# Patient Record
Sex: FEMALE | Race: BLACK OR AFRICAN AMERICAN | Employment: UNEMPLOYED | URBAN - METROPOLITAN AREA
[De-identification: names, ages, dates, MRNs, and addresses within clinical notes are randomized per-mention and may not be internally consistent; named-entity substitution may affect disease eponyms.]

---

## 2017-01-26 PROBLEM — R55 SYNCOPE: Status: ACTIVE | Noted: 2017-01-26

## 2017-01-30 ENCOUNTER — HOSPITAL ENCOUNTER (OUTPATIENT)
Dept: LAB | Age: 42
Discharge: HOME | End: 2017-01-30
Attending: INTERNAL MEDICINE
Payer: COMMERCIAL

## 2017-01-30 DIAGNOSIS — R55 SYNCOPE, UNSPECIFIED SYNCOPE TYPE: ICD-10-CM

## 2017-01-30 LAB — D DIMER PPP FEU-MCNC: 1.14 UG/ML(FEU)

## 2017-01-30 PROCEDURE — 36415 COLL VENOUS BLD VENIPUNCTURE: CPT | Performed by: INTERNAL MEDICINE

## 2017-01-30 PROCEDURE — 85379 FIBRIN DEGRADATION QUANT: CPT | Performed by: INTERNAL MEDICINE

## 2017-02-02 ENCOUNTER — HOSPITAL ENCOUNTER (OUTPATIENT)
Dept: ULTRASOUND IMAGING | Age: 42
Discharge: HOME | DRG: 176 | End: 2017-02-02
Attending: INTERNAL MEDICINE
Payer: COMMERCIAL

## 2017-02-02 ENCOUNTER — HOSPITAL ENCOUNTER (INPATIENT)
Age: 42
LOS: 2 days | Discharge: HOME | DRG: 176 | End: 2017-02-04
Attending: INTERNAL MEDICINE | Admitting: INTERNAL MEDICINE
Payer: COMMERCIAL

## 2017-02-02 ENCOUNTER — HOSPITAL ENCOUNTER (OUTPATIENT)
Dept: CT IMAGING | Age: 42
Discharge: HOME | DRG: 176 | End: 2017-02-02
Attending: INTERNAL MEDICINE
Payer: COMMERCIAL

## 2017-02-02 DIAGNOSIS — D68.51 FACTOR V LEIDEN (HCC): ICD-10-CM

## 2017-02-02 DIAGNOSIS — J90 PLEURAL EFFUSION: ICD-10-CM

## 2017-02-02 DIAGNOSIS — R55 SYNCOPE, UNSPECIFIED SYNCOPE TYPE: ICD-10-CM

## 2017-02-02 DIAGNOSIS — I26.99 OTHER ACUTE PULMONARY EMBOLISM WITHOUT ACUTE COR PULMONALE (HCC): ICD-10-CM

## 2017-02-02 LAB
ANION GAP BLD CALC-SCNC: 8 MMOL/L (ref 7–16)
BUN SERPL-MCNC: 6 MG/DL (ref 6–23)
CALCIUM SERPL-MCNC: 8.3 MG/DL (ref 8.3–10.4)
CHLORIDE SERPL-SCNC: 110 MMOL/L (ref 98–107)
CO2 SERPL-SCNC: 27 MMOL/L (ref 21–32)
CREAT SERPL-MCNC: 0.56 MG/DL (ref 0.6–1)
GLUCOSE SERPL-MCNC: 92 MG/DL (ref 65–100)
POTASSIUM SERPL-SCNC: 3.4 MMOL/L (ref 3.5–5.1)
SODIUM SERPL-SCNC: 145 MMOL/L (ref 136–145)

## 2017-02-02 PROCEDURE — 80048 BASIC METABOLIC PNL TOTAL CA: CPT | Performed by: NURSE PRACTITIONER

## 2017-02-02 PROCEDURE — 74011250637 HC RX REV CODE- 250/637: Performed by: INTERNAL MEDICINE

## 2017-02-02 PROCEDURE — 65660000000 HC RM CCU STEPDOWN

## 2017-02-02 PROCEDURE — 74011250637 HC RX REV CODE- 250/637: Performed by: NURSE PRACTITIONER

## 2017-02-02 PROCEDURE — 99254 IP/OBS CNSLTJ NEW/EST MOD 60: CPT | Performed by: INTERNAL MEDICINE

## 2017-02-02 PROCEDURE — 36415 COLL VENOUS BLD VENIPUNCTURE: CPT | Performed by: NURSE PRACTITIONER

## 2017-02-02 RX ORDER — NITROGLYCERIN 0.4 MG/1
0.4 TABLET SUBLINGUAL
Status: DISCONTINUED | OUTPATIENT
Start: 2017-02-02 | End: 2017-02-04 | Stop reason: HOSPADM

## 2017-02-02 RX ORDER — HYDROCODONE BITARTRATE AND ACETAMINOPHEN 5; 325 MG/1; MG/1
1 TABLET ORAL
Status: DISCONTINUED | OUTPATIENT
Start: 2017-02-02 | End: 2017-02-04 | Stop reason: HOSPADM

## 2017-02-02 RX ORDER — ESCITALOPRAM OXALATE 10 MG/1
10 TABLET ORAL DAILY
COMMUNITY
End: 2019-09-20

## 2017-02-02 RX ORDER — ESCITALOPRAM OXALATE 10 MG/1
15 TABLET ORAL EVERY EVENING
Status: DISCONTINUED | OUTPATIENT
Start: 2017-02-02 | End: 2017-02-04 | Stop reason: HOSPADM

## 2017-02-02 RX ORDER — SODIUM CHLORIDE 0.9 % (FLUSH) 0.9 %
10 SYRINGE (ML) INJECTION
Status: COMPLETED | OUTPATIENT
Start: 2017-02-02 | End: 2017-02-02

## 2017-02-02 RX ORDER — TEMAZEPAM 30 MG/1
30 CAPSULE ORAL
COMMUNITY
End: 2018-01-30

## 2017-02-02 RX ORDER — ESCITALOPRAM OXALATE 5 MG/1
5 TABLET ORAL DAILY
COMMUNITY
End: 2017-08-25

## 2017-02-02 RX ORDER — SERTRALINE HYDROCHLORIDE 100 MG/1
200 TABLET, FILM COATED ORAL DAILY
Status: DISCONTINUED | OUTPATIENT
Start: 2017-02-03 | End: 2017-02-02

## 2017-02-02 RX ORDER — LAMOTRIGINE 100 MG/1
100 TABLET ORAL DAILY
Status: DISCONTINUED | OUTPATIENT
Start: 2017-02-03 | End: 2017-02-04 | Stop reason: HOSPADM

## 2017-02-02 RX ORDER — MORPHINE SULFATE 2 MG/ML
2 INJECTION, SOLUTION INTRAMUSCULAR; INTRAVENOUS
Status: DISCONTINUED | OUTPATIENT
Start: 2017-02-02 | End: 2017-02-04 | Stop reason: HOSPADM

## 2017-02-02 RX ORDER — OLANZAPINE 5 MG/1
15 TABLET ORAL
Status: DISCONTINUED | OUTPATIENT
Start: 2017-02-02 | End: 2017-02-04 | Stop reason: HOSPADM

## 2017-02-02 RX ORDER — GUAIFENESIN 100 MG/5ML
81 LIQUID (ML) ORAL DAILY
Status: DISCONTINUED | OUTPATIENT
Start: 2017-02-03 | End: 2017-02-03

## 2017-02-02 RX ORDER — TEMAZEPAM 15 MG/1
30 CAPSULE ORAL
Status: DISCONTINUED | OUTPATIENT
Start: 2017-02-02 | End: 2017-02-04 | Stop reason: HOSPADM

## 2017-02-02 RX ORDER — SODIUM CHLORIDE 0.9 % (FLUSH) 0.9 %
5-10 SYRINGE (ML) INJECTION AS NEEDED
Status: DISCONTINUED | OUTPATIENT
Start: 2017-02-02 | End: 2017-02-04 | Stop reason: HOSPADM

## 2017-02-02 RX ORDER — ACETAMINOPHEN 325 MG/1
650 TABLET ORAL
Status: DISCONTINUED | OUTPATIENT
Start: 2017-02-02 | End: 2017-02-04 | Stop reason: HOSPADM

## 2017-02-02 RX ORDER — SODIUM CHLORIDE 0.9 % (FLUSH) 0.9 %
5-10 SYRINGE (ML) INJECTION EVERY 8 HOURS
Status: DISCONTINUED | OUTPATIENT
Start: 2017-02-02 | End: 2017-02-04 | Stop reason: HOSPADM

## 2017-02-02 RX ADMIN — Medication 10 ML: at 21:42

## 2017-02-02 RX ADMIN — Medication 10 ML: at 14:45

## 2017-02-02 RX ADMIN — SODIUM CHLORIDE 100 ML: 900 INJECTION, SOLUTION INTRAVENOUS at 14:45

## 2017-02-02 RX ADMIN — OLANZAPINE 15 MG: 5 TABLET, FILM COATED ORAL at 20:15

## 2017-02-02 RX ADMIN — IOVERSOL 100 ML: 741 INJECTION INTRA-ARTERIAL; INTRAVENOUS at 14:45

## 2017-02-02 RX ADMIN — TEMAZEPAM 30 MG: 15 CAPSULE ORAL at 21:40

## 2017-02-02 NOTE — PROGRESS NOTES
Spoke with Dr Lexsi Vo regarding patient PTA meds. Patient states MD recently stopped Zoloft and started patient on Lexapro 15mg daily. Patient also states takes temazepam 30mg every night for sleep.   Orders given, read back, and placed per patients prior to admission medication list.

## 2017-02-02 NOTE — PROGRESS NOTES
Patient arrived to floor via EMS. Patient alert and orientated with no distress noted. Respirations even and unlabored with no s/s of sob noted. Duel skin assessment completed with VICKIE Rubio. Patient has no skin issues noted. Patient able to ambulate independently without assistance but on total bedrest at this time r/t PE. Patient orientated to room and surroundings. Bed in low locked position with call light within reach. IV in right hand with no s/s of redness or infiltration noted.     Report to be given to oncoming RN 7p-7a

## 2017-02-02 NOTE — PROGRESS NOTES
Admission database complete. New patient packet given and reviewed with patient/family. Patient/family oriented to room and call system. SBAR handoff given to primary nurse.

## 2017-02-03 PROBLEM — R55 SYNCOPE: Status: ACTIVE | Noted: 2017-02-02

## 2017-02-03 LAB
ANION GAP BLD CALC-SCNC: 9 MMOL/L (ref 7–16)
BUN SERPL-MCNC: 5 MG/DL (ref 6–23)
CALCIUM SERPL-MCNC: 8.5 MG/DL (ref 8.3–10.4)
CHLORIDE SERPL-SCNC: 108 MMOL/L (ref 98–107)
CO2 SERPL-SCNC: 28 MMOL/L (ref 21–32)
CREAT SERPL-MCNC: 0.62 MG/DL (ref 0.6–1)
ERYTHROCYTE [DISTWIDTH] IN BLOOD BY AUTOMATED COUNT: 12.9 % (ref 11.9–14.6)
GLUCOSE SERPL-MCNC: 83 MG/DL (ref 65–100)
HCT VFR BLD AUTO: 37.3 % (ref 35.8–46.3)
HGB BLD-MCNC: 11.9 G/DL (ref 11.7–15.4)
MCH RBC QN AUTO: 27.5 PG (ref 26.1–32.9)
MCHC RBC AUTO-ENTMCNC: 31.9 G/DL (ref 31.4–35)
MCV RBC AUTO: 86.3 FL (ref 79.6–97.8)
PLATELET # BLD AUTO: 266 K/UL (ref 150–450)
PMV BLD AUTO: 10.2 FL (ref 10.8–14.1)
POTASSIUM SERPL-SCNC: 3.8 MMOL/L (ref 3.5–5.1)
RBC # BLD AUTO: 4.32 M/UL (ref 4.05–5.25)
SODIUM SERPL-SCNC: 145 MMOL/L (ref 136–145)
WBC # BLD AUTO: 6.5 K/UL (ref 4.3–11.1)

## 2017-02-03 PROCEDURE — 85027 COMPLETE CBC AUTOMATED: CPT | Performed by: NURSE PRACTITIONER

## 2017-02-03 PROCEDURE — 80048 BASIC METABOLIC PNL TOTAL CA: CPT | Performed by: NURSE PRACTITIONER

## 2017-02-03 PROCEDURE — 65660000000 HC RM CCU STEPDOWN

## 2017-02-03 PROCEDURE — 74011250637 HC RX REV CODE- 250/637: Performed by: INTERNAL MEDICINE

## 2017-02-03 PROCEDURE — 99232 SBSQ HOSP IP/OBS MODERATE 35: CPT | Performed by: INTERNAL MEDICINE

## 2017-02-03 PROCEDURE — 74011250637 HC RX REV CODE- 250/637: Performed by: NURSE PRACTITIONER

## 2017-02-03 PROCEDURE — 36415 COLL VENOUS BLD VENIPUNCTURE: CPT | Performed by: NURSE PRACTITIONER

## 2017-02-03 PROCEDURE — 93306 TTE W/DOPPLER COMPLETE: CPT

## 2017-02-03 RX ADMIN — ASPIRIN 81 MG 81 MG: 81 TABLET ORAL at 07:54

## 2017-02-03 RX ADMIN — RIVAROXABAN 15 MG: 15 TABLET, FILM COATED ORAL at 16:57

## 2017-02-03 RX ADMIN — LAMOTRIGINE 100 MG: 100 TABLET ORAL at 07:54

## 2017-02-03 RX ADMIN — Medication 10 ML: at 05:55

## 2017-02-03 RX ADMIN — ESCITALOPRAM OXALATE 15 MG: 10 TABLET ORAL at 16:57

## 2017-02-03 RX ADMIN — OLANZAPINE 15 MG: 5 TABLET, FILM COATED ORAL at 21:23

## 2017-02-03 RX ADMIN — RIVAROXABAN 15 MG: 15 TABLET, FILM COATED ORAL at 07:53

## 2017-02-03 RX ADMIN — Medication 10 ML: at 22:00

## 2017-02-03 RX ADMIN — TEMAZEPAM 30 MG: 15 CAPSULE ORAL at 21:31

## 2017-02-03 RX ADMIN — Medication 5 ML: at 16:58

## 2017-02-03 NOTE — PROGRESS NOTES
[facility] CARDIOLOGY PROGRESS NOTE      2/3/2017 10:51 AM    Admit Date: 2/2/2017    Admit Diagnosis: PULMONARY EMBOLI;Pulmonary embolism (HCC)      Subjective:   No chest pain or dyspnea.   bedrest       Objective:      Vitals:    02/02/17 2355 02/03/17 0358 02/03/17 0415 02/03/17 0734   BP: 90/65 90/59  98/67   Pulse: 78 (!) 125 80 72   Resp: 18 19  19   Temp: 97.6 °F (36.4 °C) 97.6 °F (36.4 °C)  97.4 °F (36.3 °C)   SpO2: 93% 91%  96%       Physical Exam:  Neck-   CV- rr+r  Lungs- clear  Ext-  Skin- warm and dry        Data Review:   Recent Labs      02/03/17   0640  02/02/17   2045   NA  145  145   K  3.8  3.4*   BUN  5*  6   CREA  0.62  0.56*   GLU  83  92   WBC  6.5   --    HGB  11.9   --    HCT  37.3   --    PLT  266   --        Assessment and Plan:     Principal Problem:    Pulmonary embolism ([facility] [address] (2/2/2017)        Factor V Leiden    Fibroid  operation in December     Xarelto begun  does not need aspirin which would increase bleeding risk   echo pending     home  tomorrow if stable       Active Problems:    Syncope (2/2/2017)      Factor V Leiden ([facility] [address] (2/2/2017)        Melida Tran MD

## 2017-02-03 NOTE — CONSULTS
CONSULT NOTE    Carlos Zuniga    2/2/2017    Date of Admission:  2/2/2017    The patient's chart is reviewed and the patient is discussed with the staff. Subjective:     Patient is a 46 y.o. female seen and evaluated at the request of Dr. Amanda Camacho. The patient has a history of factor V leiden mutation and had a PE in 2010 around the time of a c section. She lived in [place] at that time, was treated with coumadin for 1 year and followed by hematology there. She was then taken off the coumadin in 2011 and has been on no blood thinners since. About a month ago she suddenly lost consciousness while talking to a family member. With her first PE she had dyspnea, this time she denies any dyspnea, chest pain, leg swelling, or calf pain. She does not smoke and is on no hormone therapy. She denies any periods of long immobility. She was seen by cardiology for syncope and part of her workup was an outpatient CT done today which showed multiple R sided PE's. She was admitted for tele monitoring and initiation of anticoagulation. Last time when she was on coumadin she had no issues with bleeding. Review of Systems  A comprehensive review of systems was negative except for that written in the HPI. Patient Active Problem List   Diagnosis Code    Syncope R55    Pulmonary embolism ([facility] [address] I26.99    Factor V Leiden ([facility] D68.51       Prior to Admission Medications   Prescriptions Last Dose Informant Patient Reported? Taking? OLANZapine (ZYPREXA) 15 mg tablet   Yes No   Sig: Take 15 mg by mouth nightly. escitalopram oxalate (LEXAPRO) 10 mg tablet   Yes Yes   Sig: Take 10 mg by mouth daily. escitalopram oxalate (LEXAPRO) 5 mg tablet   Yes Yes   Sig: Take 5 mg by mouth daily. Patient takes a 10mg and 5mg tablet to total 15mg once daily. lamoTRIgine (LAMICTAL) 100 mg tablet   Yes No   Sig: Take  by mouth daily.    temazepam (RESTORIL) 30 mg capsule   Yes Yes   Sig: Take 30 mg by mouth nightly. Facility-Administered Medications: None       Past Medical History   Diagnosis Date    Bipolar disorder, unspecified ([facility] [address]     Pulmonary embolism ([facility] [address]      Past Surgical History   Procedure Laterality Date    Hx gastric bypass      Hx  section      Hx myomectomy       Social History     Social History    Marital status:      Spouse name: N/A    Number of children: N/A    Years of education: N/A     Occupational History    Not on file.      Social History Main Topics    Smoking status: Never Smoker    Smokeless tobacco: Never Used    Alcohol use No    Drug use: No    Sexual activity: Not on file     Other Topics Concern    Not on file     Social History Narrative     Family History   Problem Relation Age of Onset    Stroke Father     Hypertension Father     Diabetes Father      Allergies   Allergen Reactions    Sulfa (Sulfonamide Antibiotics) Unknown (comments)       Current Facility-Administered Medications   Medication Dose Route Frequency    OLANZapine (ZyPREXA) tablet 15 mg  15 mg Oral QHS    [START ON 2/3/2017] lamoTRIgine (LaMICtal) tablet 100 mg  100 mg Oral DAILY    sodium chloride (NS) flush 5-10 mL  5-10 mL IntraVENous Q8H    sodium chloride (NS) flush 5-10 mL  5-10 mL IntraVENous PRN    [START ON 2/3/2017] aspirin chewable tablet 81 mg  81 mg Oral DAILY    nitroglycerin (NITROSTAT) tablet 0.4 mg  0.4 mg SubLINGual Q5MIN PRN    acetaminophen (TYLENOL) tablet 650 mg  650 mg Oral Q4H PRN    HYDROcodone-acetaminophen (NORCO) 5-325 mg per tablet 1 Tab  1 Tab Oral Q4H PRN    morphine injection 2 mg  2 mg IntraVENous Q4H PRN    rivaroxaban (XARELTO) tablet 15 mg  15 mg Oral BID WITH MEALS    escitalopram oxalate (LEXAPRO) tablet 15 mg  15 mg Oral QPM    temazepam (RESTORIL) capsule 30 mg  30 mg Oral QHS PRN         Objective:     Vitals:    17 1811   BP: 117/75   Pulse: 70   Resp: 20   Temp: 97.9 °F (36.6 °C)   SpO2: 98%       PHYSICAL EXAM Constitutional:  the patient is well developed and in no acute distress  EENMT:  Sclera clear, pupils equal, oral mucosa moist  Respiratory: CTA B, no w/r/r  Cardiovascular:  RRR without M,G,R  Gastrointestinal: soft and non-tender; with positive bowel sounds. Musculoskeletal: warm without cyanosis. There is no lower leg edema. Skin:  no jaundice or rashes, no wounds   Neurologic: no gross neuro deficits     Psychiatric:  alert and oriented x ppt    Chest CT:    2/2/17  1. Multiple right-sided pulmonary emboli. 2. Mild cardiomegaly and small bilateral dependent pleural effusions. An  additional component of mild fluid overload cannot be excluded which may be  secondary to the pulmonary emboli. No results for input(s): WBC, HGB, HCT, PLT, INR, HGBEXT, HCTEXT, PLTEXT, HGBEXT, HCTEXT, PLTEXT in the last 72 hours. No lab exists for component: INREXT, INREXT  No results for input(s): NA, K, CL, GLU, CO2, BUN, CREA, MG, PHOS, CA, TROIQ, ALB, TBIL, TBILI, GPT, ALT, SGOT, BNPP in the last 72 hours. No lab exists for component: TROIP  No results for input(s): PH, PCO2, PO2, HCO3 in the last 72 hours. No results for input(s): LCAD, LAC in the last 72 hours. Assessment:  (Medical Decision Making)     Hospital Problems  Date Reviewed: 1/30/2017          Codes Class Noted POA    * (Principal)Pulmonary embolism ([facility] [address] ICD-10-CM: I26.99  ICD-9-CM: 415.19  2/2/2017 Unknown        Factor V Leiden [facility]) ICD-10-CM: T59.93  ICD-9-CM: 289.81  2/2/2017 Unknown        Syncope ICD-10-CM: R55  ICD-9-CM: 780.2  1/26/2017 Yes            Patient with factor V leiden mutation, now presenting with her second episode of PE. While this certainly may be the cause of her syncope, her presenting symptom last time was simply dyspnea. Plan:  (Medical Decision Making)     --Agree with tele monitoring to rule out other abnormalities. -Agree with choice of xarelto for anticoagulation.  Discussed with her that she will need life long anticoagulation since this is her second episode of clotting.   -she was requesting to be connected with hematology. I do not think inpatient heme consult is necessary but would support an outpatient referral to them for ongoing management. -currently on room air.   --TTE ordered by primary team. Will follow up results tomorrow. More than 50% of the time documented was spent in face-to-face contact with the patient and in the care of the patient on the floor/unit where the patient is located. Thank you very much for this referral.  We appreciate the opportunity to participate in this patient's care. Will follow along with above stated plan.     Sean Tracy MD

## 2017-02-03 NOTE — PROGRESS NOTES
Francoise Stein  Admission Date: 2017             Daily Progress Note: 2/3/2017    The patient's chart is reviewed and the patient is discussed with the staff.    46 y.o. Admitted by cardiology and she suddenly lost consciousness while talking to a family member about 1 month ago. She was seen by cardiology and part of her workup was an outpatient CT done today which showed multiple R sided PE's. She was admitted for tele monitoring and initiation of anticoagulation. She has history of factor V Leiden mutation, had a PE in  around the time of a , was treated with coumadin for 1 year and followed by hematology in [place]. Was taken off Coumadin in  and had had no bleeding issues. With her first PE she had dyspnea, but denies any dyspnea, chest pain, leg swelling, or calf pain. She does not smoke and is on no hormone therapy. She denies any periods of long immobility. Subjective:     Lying in bed and denies pain or shortness of breath.   On bedrest.      Current Facility-Administered Medications   Medication Dose Route Frequency    OLANZapine (ZyPREXA) tablet 15 mg  15 mg Oral QHS    lamoTRIgine (LaMICtal) tablet 100 mg  100 mg Oral DAILY    sodium chloride (NS) flush 5-10 mL  5-10 mL IntraVENous Q8H    sodium chloride (NS) flush 5-10 mL  5-10 mL IntraVENous PRN    aspirin chewable tablet 81 mg  81 mg Oral DAILY    nitroglycerin (NITROSTAT) tablet 0.4 mg  0.4 mg SubLINGual Q5MIN PRN    acetaminophen (TYLENOL) tablet 650 mg  650 mg Oral Q4H PRN    HYDROcodone-acetaminophen (NORCO) 5-325 mg per tablet 1 Tab  1 Tab Oral Q4H PRN    morphine injection 2 mg  2 mg IntraVENous Q4H PRN    rivaroxaban (XARELTO) tablet 15 mg  15 mg Oral BID WITH MEALS    escitalopram oxalate (LEXAPRO) tablet 15 mg  15 mg Oral QPM    temazepam (RESTORIL) capsule 30 mg  30 mg Oral QHS PRN       Review of Systems  Constitutional: negative for fever, chills, sweats  Cardiovascular: negative for chest pain, palpitations, syncope, edema  Gastrointestinal:  negative for dysphagia, reflux, vomiting, diarrhea, abdominal pain, or melena  Neurologic:  negative for focal weakness, numbness, headache    Objective:     Vitals:    02/02/17 2355 02/03/17 0358 02/03/17 0415 02/03/17 0734   BP: 90/65 90/59  98/67   Pulse: 78 (!) 125 80 72   Resp: 18 19  19   Temp: 97.6 °F (36.4 °C) 97.6 °F (36.4 °C)  97.4 °F (36.3 °C)   SpO2: 93% 91%  96%     Intake and Output:   02/01 1901 - 02/03 0700  In: 80 [P.O.:580]  Out: -        Physical Exam:   Constitution:  the patient is well developed and in no acute distress, room air  EENMT:  Sclera clear, pupils equal, oral mucosa moist  Respiratory: clear anterior and posterior, no wheezing  Cardiovascular:  RRR without M,G,R  Gastrointestinal: soft and non-tender; with positive bowel sounds. Musculoskeletal: warm without cyanosis. There is no lower leg edema. Skin:  no jaundice or rashes, no wounds   Neurologic: no gross neuro deficits     Psychiatric:  alert and oriented x 3    CHEST CT 2/2/17:  1. Multiple right-sided pulmonary emboli. 2. Mild cardiomegaly and small bilateral dependent pleural effusions. An additional component of mild fluid overload cannot be excluded which may be secondary to the pulmonary emboli. CHEST XRAY: none today      LAB  No results for input(s): GLUCPOC in the last 72 hours. No lab exists for component: [place]   Recent Labs      02/03/17   0640   WBC  6.5   HGB  11.9   HCT  37.3   PLT  266     Recent Labs      02/02/17   2045   NA  145   K  3.4*   CL  110*   CO2  27   GLU  92   BUN  6   CREA  0.56*   CA  8.3     No results for input(s): PH, PCO2, PO2, HCO3 in the last 72 hours. No results for input(s): LCAD, LAC in the last 72 hours.       Assessment:  (Medical Decision Making)     Hospital Problems  Date Reviewed: 2/3/2017          Codes Class Noted POA    Syncope ICD-10-CM: R55  ICD-9-CM: 780.2  2/2/2017 Yes    1 month ago    * (Principal)Pulmonary embolism ([facility] [address] ICD-10-CM: I26.99  ICD-9-CM: 415.19  2/2/2017 Yes    Recurrent--fist in 2010    Factor V Leiden [facility]) ICD-10-CM: P23.89  ICD-9-CM: 289.81  2/2/2017 Yes    chronic          Plan:  (Medical Decision Making)     --Xarelto 15mg BID--needs life long anticoagulation  --ECHO pending  --LE US negative for DVT  --DDimer 1.14  --CBC, BMP pending  --OOB with assistance--LE negative  --Will need hematology follow up as out patient. More than 50% of the time documented was spent in face-to-face contact with the patient and in the care of the patient on the floor/unit where the patient is located. David Becerra NP     Lungs:  clear  Heart:  RRR with no Murmur/Rubs/Gallops; no definite increased P2. Additional Comments: Reports that she may not have been able to move legs after waking up from syncopal episode. Needs bubble echo. I have spoken with and examined the patient. I agree with the above assessment and plan as documented.     Presley Gupta MD

## 2017-02-03 NOTE — PROGRESS NOTES
Pt is lying quietly in bed. Pt is on full bedrest. Respirations are even and unlabored. No signs or symptoms of distress are noted. No SOB or pain is reported at this time. Call light is within reach. Will continue to monitor.

## 2017-02-03 NOTE — PROGRESS NOTES
Patient voices no concerns at this time. Call light within reach. Patient able to ambulate with assistance r/t syncope episodes. Will continue to monitor.

## 2017-02-03 NOTE — PROGRESS NOTES
Patient in bed resting with no complaints at this time. Patient is alert and orientated with no distress noted. IV intact and patent with no s/s of infection noted. Respirations even and unlabored with heart rate regular. Patient able to ambulate independently without assistance but on bedrest at this time r/t PE. Bed in low locked position with call light within reach. Patient instructed to call if assistance is needed. Will continue to monitor.

## 2017-02-03 NOTE — H&P
[address]   HISTORY AND PHYSICAL       Name:  Porfirio Juan   MR#:  [id number]   :  1975   Account #:  [de-identified]   Date of Adm:  2017       REASON FOR ADMISSION: Acute pulmonary emboli. HISTORY OF PRESENT ILLNESS: Ms. Brett Hewitt is a pleasant 24-year-old   female who was seen in our office on 2017 after a syncopal   episode. She was talking with her sister, when she suddenly   became unresponsive. She denies any chest pain or dyspnea prior   to or after the event. She has a history of prior pulmonary   embolism after a  and is homozygous for factor V Leiden   gene mutation. She was taken off anticoagulation by hematologist   in the past. She was seen by Wilmer Mccord. Demetrio Argueta MD, on   2017, and a CT scan of her chest was ordered. This shows   multiple right-sided pulmonary emboli. There was mild   cardiomegaly and small bilateral dependent pleural effusions. She also underwent lower extremity duplex, which showed no   evidence of deep venous thrombosis. Based on her abnormal CT   scan, she was admitted for anticoagulation initiation. She is   currently chest pain free. She has no dyspnea. She is not   hypoxic. PAST MEDICAL HISTORY   1. History of homozygous factor V Leiden deficiency. 2. History of pulmonary embolism in  after a . 3. Prior history of moderate pulmonary hypertension with RVSP 49   on a prior echocardiogram.   4. Syncope. 5. Bipolar disorder. 6. History of prior  and gastric bypass. MEDICATIONS   Include   1. Lexapro 15 mg a day. 2. Lamictal 100 mg a day. 3. Zyprexa 15 mg at bedtime. 4. Restoril 30 mg at bedtime. ALLERGIES: SULFA. SOCIAL HISTORY: She denies any tobacco or alcohol use. FAMILY HISTORY: Father with stroke, diabetes and hypertension. REVIEW OF SYSTEMS   CONSTITUTIONAL: No fever or chills. EYES: No acute visual disturbances. ENT: No changes in hearing or epistaxis. CARDIOVASCULAR: Syncope. No chest pain. PULMONARY: No active dyspnea. GASTROINTESTINAL: No blood in her stool or melena. GENITOURINARY: Recently completed her menstrual cycle. MUSCULOSKELETAL: No muscular weakness. NEUROLOGIC: Recent syncope. No focal motor or sensory defects. SKIN: No active rashes or lesions. PSYCHIATRIC: Well-controlled bipolar disorder. No active   depression or hailey. ENDOCRINE: No heat or cold intolerance. PHYSICAL EXAMINATION   VITAL SIGNS: Temperature is 97.9, pulse 70, blood pressure   117/75, pulse oximetry 98% on room air. GENERAL: Pleasant female in no acute distress. HEENT: Pupils equal, round, react to light and accommodation. Extraocular movements intact. No scleral icterus or injection. NECK: Supple, 2+ carotid pulses. No carotid bruits. CARDIOVASCULAR: Regular rate and rhythm. Normal S1, S2. No   murmurs, rubs or gallops. LUNGS: Clear to auscultation and percussion bilaterally. ABDOMEN: Soft, nontender, nondistended with normoactive bowel   sounds. EXTREMITIES: No significant edema, 2+ distal pulses. NEUROLOGIC: Alert and oriented x3. Cranial nerves 2 through 12   are grossly intact. Motor and sensory exam nonfocal.   PSYCHIATRIC: Normal mood and affect. SKIN: Warm and dry. LYMPHATIC: No cervical lymphadenopathy. LABORATORY DATA: Her D-dimer on 01/30/2017 was 1.14. CT scan and lower extremity duplex as above. ASSESSMENT AND PLAN:   1. Acute pulmonary embolism: Start Xarelto 15 mg p.o. b.i.d. Check echocardiogram. Place on telemetry. 2. Syncope: Likely secondary to pulmonary emboli. We will place   on telemetry for cardiac arrhythmias. Check echocardiogram   tomorrow morning. 3. Mild to moderate pulmonary hypertension: Check echo and   evaluate RV systolic pressures. 4. Further recommendations as course progresses.         MD MATTY Reyes / Dorothy Noble   D:  02/02/2017   18:43   T:  02/02/2017   19:07   Job #: [id number]

## 2017-02-04 VITALS
DIASTOLIC BLOOD PRESSURE: 74 MMHG | SYSTOLIC BLOOD PRESSURE: 100 MMHG | TEMPERATURE: 97.9 F | HEART RATE: 68 BPM | RESPIRATION RATE: 16 BRPM | OXYGEN SATURATION: 94 %

## 2017-02-04 PROBLEM — J90 PLEURAL EFFUSION: Status: ACTIVE | Noted: 2017-02-04

## 2017-02-04 LAB
ANION GAP BLD CALC-SCNC: 7 MMOL/L (ref 7–16)
BUN SERPL-MCNC: 8 MG/DL (ref 6–23)
CALCIUM SERPL-MCNC: 8.4 MG/DL (ref 8.3–10.4)
CHLORIDE SERPL-SCNC: 108 MMOL/L (ref 98–107)
CO2 SERPL-SCNC: 27 MMOL/L (ref 21–32)
CREAT SERPL-MCNC: 0.71 MG/DL (ref 0.6–1)
ERYTHROCYTE [DISTWIDTH] IN BLOOD BY AUTOMATED COUNT: 12.8 % (ref 11.9–14.6)
GLUCOSE SERPL-MCNC: 117 MG/DL (ref 65–100)
HCT VFR BLD AUTO: 38.6 % (ref 35.8–46.3)
HGB BLD-MCNC: 12.4 G/DL (ref 11.7–15.4)
MCH RBC QN AUTO: 27.9 PG (ref 26.1–32.9)
MCHC RBC AUTO-ENTMCNC: 32.1 G/DL (ref 31.4–35)
MCV RBC AUTO: 86.7 FL (ref 79.6–97.8)
PLATELET # BLD AUTO: 271 K/UL (ref 150–450)
PMV BLD AUTO: 10.2 FL (ref 10.8–14.1)
POTASSIUM SERPL-SCNC: 3.8 MMOL/L (ref 3.5–5.1)
RBC # BLD AUTO: 4.45 M/UL (ref 4.05–5.25)
SODIUM SERPL-SCNC: 142 MMOL/L (ref 136–145)
WBC # BLD AUTO: 6.9 K/UL (ref 4.3–11.1)

## 2017-02-04 PROCEDURE — 36415 COLL VENOUS BLD VENIPUNCTURE: CPT | Performed by: NURSE PRACTITIONER

## 2017-02-04 PROCEDURE — 99232 SBSQ HOSP IP/OBS MODERATE 35: CPT | Performed by: INTERNAL MEDICINE

## 2017-02-04 PROCEDURE — 85027 COMPLETE CBC AUTOMATED: CPT | Performed by: NURSE PRACTITIONER

## 2017-02-04 PROCEDURE — 74011250637 HC RX REV CODE- 250/637: Performed by: NURSE PRACTITIONER

## 2017-02-04 PROCEDURE — 80048 BASIC METABOLIC PNL TOTAL CA: CPT | Performed by: NURSE PRACTITIONER

## 2017-02-04 RX ADMIN — RIVAROXABAN 15 MG: 15 TABLET, FILM COATED ORAL at 07:59

## 2017-02-04 RX ADMIN — Medication 10 ML: at 05:28

## 2017-02-04 RX ADMIN — LAMOTRIGINE 100 MG: 100 TABLET ORAL at 08:00

## 2017-02-04 NOTE — PROGRESS NOTES
[facility]  Admission Date: 2017             Daily Progress Note: 2017    The patient's chart is reviewed and the patient is discussed with the staff.    46 y.o. Admitted by cardiology and she suddenly lost consciousness while talking to a family member about 1 month ago. She was seen by cardiology and part of her workup was an outpatient CT done today which showed multiple R sided PE's. She was admitted for tele monitoring and initiation of anticoagulation. She has history of factor V Leiden mutation, had a PE in  around the time of a , was treated with coumadin for 1 year and followed by hematology in [place]. Was taken off Coumadin in  and had had no bleeding issues. With her first PE she had dyspnea, but denies any dyspnea, chest pain, leg swelling, or calf pain. She does not smoke and is on no hormone therapy. She denies any periods of long immobility. Subjective:     Comfortable on RA. No problems getting to BR. No complaints of pain, hemoptysis, or dyspnea.     Current Facility-Administered Medications   Medication Dose Route Frequency    OLANZapine (ZyPREXA) tablet 15 mg  15 mg Oral QHS    lamoTRIgine (LaMICtal) tablet 100 mg  100 mg Oral DAILY    sodium chloride (NS) flush 5-10 mL  5-10 mL IntraVENous Q8H    sodium chloride (NS) flush 5-10 mL  5-10 mL IntraVENous PRN    nitroglycerin (NITROSTAT) tablet 0.4 mg  0.4 mg SubLINGual Q5MIN PRN    acetaminophen (TYLENOL) tablet 650 mg  650 mg Oral Q4H PRN    HYDROcodone-acetaminophen (NORCO) 5-325 mg per tablet 1 Tab  1 Tab Oral Q4H PRN    morphine injection 2 mg  2 mg IntraVENous Q4H PRN    rivaroxaban (XARELTO) tablet 15 mg  15 mg Oral BID WITH MEALS    escitalopram oxalate (LEXAPRO) tablet 15 mg  15 mg Oral QPM    temazepam (RESTORIL) capsule 30 mg  30 mg Oral QHS PRN       Review of Systems  Constitutional: negative for fever, chills, sweats  Cardiovascular: negative for chest pain, palpitations, syncope, edema  Gastrointestinal:  negative for dysphagia, reflux, vomiting, diarrhea, abdominal pain, or melena  Neurologic:  negative for focal weakness, numbness, headache    Objective:     Vitals:    02/03/17 1453 02/03/17 1922 02/03/17 2237 02/04/17 0346   BP: 98/55 98/51 90/60 91/55   Pulse: 72 88 75 69   Resp: 18 20 20 18   Temp: 98 °F (36.7 °C) 98 °F (36.7 °C) 97.8 °F (36.6 °C) 97.9 °F (36.6 °C)   SpO2: 97% 93% 96% 97%     Intake and Output:   02/02 1901 - 02/04 0700  In: 1660 [P.O.:1660]  Out: 300 [Urine:300]       Physical Exam:   Constitution:  the patient is well developed and in no acute distress, room air  EENMT:  Sclera clear, pupils equal, oral mucosa moist  Respiratory: clear anterior and posterior, no wheezing  Cardiovascular:  RRR without M,G,R  Gastrointestinal: soft and non-tender; with positive bowel sounds. Musculoskeletal: warm without cyanosis. There is no lower leg edema. Skin:  no jaundice or rashes, no wounds   Neurologic: no gross neuro deficits     Psychiatric:  alert and oriented x 3    CHEST CT 2/2/17:  1. Multiple right-sided pulmonary emboli. 2. Mild cardiomegaly and small bilateral dependent pleural effusions. An additional component of mild fluid overload cannot be excluded which may be secondary to the pulmonary emboli. CHEST XRAY: none today      LAB  No results for input(s): GLUCPOC in the last 72 hours. No lab exists for component: [place]   Recent Labs      02/03/17   0640   WBC  6.5   HGB  11.9   HCT  37.3   PLT  266     Recent Labs      02/03/17   0640  02/02/17   2045   NA  145  145   K  3.8  3.4*   CL  108*  110*   CO2  28  27   GLU  83  92   BUN  5*  6   CREA  0.62  0.56*   CA  8.5  8.3     No results for input(s): PH, PCO2, PO2, HCO3 in the last 72 hours. No results for input(s): LCAD, LAC in the last 72 hours. Echo:    SUMMARY:    -  Left ventricle: Systolic function was normal. Ejection fraction was  estimated in the range of 55 % to 60 %. There were no regional wall motion  abnormalities. Doppler parameters were consistent with mild diastolic  dysfunction (grade 1). -  Atrial septum: Agitated saline contrast injection (bubble study) was  performed. There was no right-to-left shunt, at rest or induced by the   Valsalva  maneuver. -  Inferior vena cava, hepatic veins: The inferior vena cava was dilated. Respirophasic changes in dimension were absent. -  Pericardium: There was a moderate-sized left pleural effusion. Assessment:  (Medical Decision Making)     Hospital Problems  Date Reviewed: 2/4/2017          Codes Class Noted POA    Pleural effusion ICD-10-CM: J90  ICD-9-CM: 511.9  2/4/2017 Unknown    Small by CT    Syncope ICD-10-CM: R55  ICD-9-CM: 780.2  2/2/2017 Yes    From PTE. * (Principal)Pulmonary embolism ([address] ICD-10-CM: I26.99  ICD-9-CM: 415.19  2/2/2017 Yes    On Xarelto    Factor V Leiden [facility]) ICD-10-CM: K36.90  ICD-9-CM: 289.81  2/2/2017 Yes    Will need lifelong anticoagulation at this point. Plan:  (Medical Decision Making)     --Xarelto 15mg BID--needs life long anticoagulation. Convert to 20 mg daily after 3 weeks. --Effusion should resolve with time. Can follow up with us if persists. --Will need hematology follow up as out patient. --Lilianae Bella for discharge from our standpoint. More than 50% of the time documented was spent in face-to-face contact with the patient and in the care of the patient on the floor/unit where the patient is located.     Ashre Lopez MD

## 2017-02-04 NOTE — DISCHARGE INSTRUCTIONS
DISCHARGE SUMMARY from Nurse    The following personal items are in your possession at time of discharge:       Visual Aid: None     Home Medications: None                      PATIENT INSTRUCTIONS:    After general anesthesia or intravenous sedation, for 24 hours or while taking prescription Narcotics:  · Limit your activities  · Do not drive and operate hazardous machinery  · Do not make important personal or business decisions  · Do  not drink alcoholic beverages  · If you have not urinated within 8 hours after discharge, please contact your surgeon on call. Report the following to your surgeon:  · Excessive pain, swelling, redness or odor of or around the surgical area  · Temperature over 100.5  · Nausea and vomiting lasting longer than 4 hours or if unable to take medications  · Any signs of decreased circulation or nerve impairment to extremity: change in color, persistent  numbness, tingling, coldness or increase pain  · Any questions        What to do at Home:  Recommended activity: Activity as tolerated. If you experience any of the following symptoms shortness of breath or fatigue unrelieved by rest, please follow up with PCP. *  Please give a list of your current medications to your Primary Care Provider. *  Please update this list whenever your medications are discontinued, doses are      changed, or new medications (including over-the-counter products) are added. *  Please carry medication information at all times in case of emergency situations. These are general instructions for a healthy lifestyle:    No smoking/ No tobacco products/ Avoid exposure to second hand smoke    Surgeon General's Warning:  Quitting smoking now greatly reduces serious risk to your health.     Obesity, smoking, and sedentary lifestyle greatly increases your risk for illness    A healthy diet, regular physical exercise & weight monitoring are important for maintaining a healthy lifestyle    You may be retaining fluid if you have a history of heart failure or if you experience any of the following symptoms:  Weight gain of 3 pounds or more overnight or 5 pounds in a week, increased swelling in our hands or feet or shortness of breath while lying flat in bed. Please call your doctor as soon as you notice any of these symptoms; do not wait until your next office visit. Recognize signs and symptoms of STROKE:    F-face looks uneven    A-arms unable to move or move unevenly    S-speech slurred or non-existent    T-time-call 911 as soon as signs and symptoms begin-DO NOT go       Back to bed or wait to see if you get better-TIME IS BRAIN. Warning Signs of HEART ATTACK     Call 911 if you have these symptoms:   Chest discomfort. Most heart attacks involve discomfort in the center of the chest that lasts more than a few minutes, or that goes away and comes back. It can feel like uncomfortable pressure, squeezing, fullness, or pain.  Discomfort in other areas of the upper body. Symptoms can include pain or discomfort in one or both arms, the back, neck, jaw, or stomach.  Shortness of breath with or without chest discomfort.  Other signs may include breaking out in a cold sweat, nausea, or lightheadedness. Don't wait more than five minutes to call 911 - MINUTES MATTER! Fast action can save your life. Calling 911 is almost always the fastest way to get lifesaving treatment. Emergency Medical Services staff can begin treatment when they arrive -- up to an hour sooner than if someone gets to the hospital by car. The discharge information has been reviewed with the patient. The patient verbalized understanding. Discharge medications reviewed with the patient and appropriate educational materials and side effects teaching were provided. Pulmonary Embolism: Care Instructions  Your Care Instructions  Pulmonary embolism is the sudden blockage of an artery in the lung.  Blood clots in the deep veins of the leg or pelvis (deep vein thrombosis, or DVT) are the most common cause. These blood clots can travel to the lungs. Pulmonary embolism can be very serious. Because you have had one pulmonary embolism, you are at greater risk for having another one. But you can take steps to prevent another pulmonary embolism by following your doctor's instructions. You will probably take a prescription blood-thinning medicine to prevent blood clots. A blood thinner can stop a blood clot from growing larger and prevent new clots from forming. Follow-up care is a key part of your treatment and safety. Be sure to make and go to all appointments, and call your doctor if you are having problems. It's also a good idea to know your test results and keep a list of the medicines you take. How can you care for yourself at home? · Take your medicines exactly as prescribed. Call your doctor if you think you are having a problem with your medicine. You will get more details on the specific medicines your doctor prescribes. · If you are taking a blood thinner, be sure you get instructions about how to take your medicine safely. Blood thinners can cause serious bleeding problems. Preventing future pulmonary embolisms  · Exercise. Keep blood moving in your legs to keep clots from forming. If you are traveling by car, stop every hour or so. Get out and walk around for a few minutes. If you are traveling by bus, train, or plane, get out of your seat and walk up and down the aisles every hour or so. You also can do leg exercises while you are seated. Pump your feet up and down by pulling your toes up toward your knees then pointing them down. · Get up out of bed as soon as possible after an illness or surgery. · Do not smoke. If you need help quitting, talk to your doctor about stop-smoking programs and medicines. These can increase your chances of quitting for good.   · Check with your doctor before taking hormone or birth control pills. These may increase your risk of blot clots. · Ask your doctor about wearing compression stockings to help prevent blood clots in your legs. You can buy these with a prescription at medical supply stores and some drugstores. When should you call for help? Call 911 anytime you think you may need emergency care. For example, call if:  · You have shortness of breath. · You have chest pain. · You passed out (lost consciousness). · You cough up blood. Call your doctor now or seek immediate medical care if:  · You have new or worsening pain or swelling in your leg. Watch closely for changes in your health, and be sure to contact your doctor if:  · You do not get better as expected. Where can you learn more? Go to http://denise-zarina.info/. Enter D997 in the search box to learn more about \"Pulmonary Embolism: Care Instructions. \"  Current as of: June 4, 2016  Content Version: 11.1  © 6654-7522 [employer]. Care instructions adapted under license by [employer] (which disclaims liability or warranty for this information). If you have questions about a medical condition or this instruction, always ask your healthcare professional. [address] any warranty or liability for your use of this information.

## 2017-02-04 NOTE — PROGRESS NOTES
Patient in bed resting with no complaints at this time. Patient alert and orientated with no distress noted. IV intact and patent with s/s of infection noted. Respirations even and unlabored with heart rate regular. Patient able to ambulate independently without assistance but right now needs x1 r/t syncope episode. Bed in low locked position with call light within reach. Patient instructed to call if assistance is needed. Will continue to monitor.

## 2017-02-04 NOTE — DISCHARGE SUMMARY
[facility] Cardiology Discharge Summary     Patient ID:  Luke Rojas  [id number]  89 y.o.  1975    Admit date: 2/2/2017    Discharge date:  2/4/17    Admitting Physician: Alvin Lee MD     Discharge Physician: Raghavendra Gimenez NP/Dr. Alex Mccallum    Admission Diagnoses: PULMONARY EMBOLI  Pulmonary embolism [facility])    Discharge Diagnoses:   Patient Active Problem List    Diagnosis Date Noted    Pleural effusion 02/04/2017    Syncope 02/02/2017    Pulmonary embolism ([facility] [address] 02/02/2017    Factor V Leiden ([facility] [address] 02/02/2017       Cardiology Procedures this admission:  EchoCardiogram  CT chest  Consults: Pulmonary/Intensive care    Hospital Course: [place] [facility] [place] for [place]. The patient had prior hx PE so Dr. Nicol Middleton ordered Ct scan of chest. The CT scan showed multiple right sided PEs. The patient was admitted and started on xarelto 15mg bid x 21 days then she will take 20 mg daily. An echocardiogram showed -  Left ventricle: Systolic function was normal. Ejection fraction was  estimated in the range of 55 % to 60 %. There were no regional wall motion abnormalities. Doppler parameters were consistent with mild diastolic dysfunction (grade 1). -  Atrial septum: Agitated saline contrast injection (bubble study) was performed. There was no right-to-left shunt, at rest or induced by the Valsalva maneuver. -  Inferior vena cava, hepatic veins: The inferior vena cava was dilated. Respirophasic changes in dimension were absent. -  Pericardium: There was a moderate-sized left pleural effusion. [facility] was consulted and felt her pleural effusion secondary to PEs. The patient also had dopplers of lower ext that were negative for DVT. The morning of 2/4/17, the patient was feeling much better. The patient was seen and examined by Dr. Alex Mccallum and was determined stable and ready for discharge home.   The patient was instructed on the importance of medication compliance and outpatient follow up. The patient will follow up with [address] [place] [address] [address]. Puja Mullins in 2-3 weeks. The patient will need out patient heme consult. DISPOSITION: The patient is being discharged home in stable condition on a low saturated fat, low cholesterol and low salt diet. The patient is instructed to advance activities as tolerated to the limit of fatigue or shortness of breath. The patient is instructed to call the office or return to the ER for immediate evaluation for any severe shortness of breath, chest pain, prolonged palpitations, near syncope or syncope. Discharge Exam:   Visit Vitals    /74    Pulse 68    Temp 97.9 °F (36.6 °C)    Resp 16    SpO2 94%     Patient has been seen by Dr. Kaylin Vargas: see his progress note for exam details. Recent Results (from the past 24 hour(s))   METABOLIC PANEL, BASIC    Collection Time: 02/04/17  8:28 AM   Result Value Ref Range    Sodium 142 136 - 145 mmol/L    Potassium 3.8 3.5 - 5.1 mmol/L    Chloride 108 (H) 98 - 107 mmol/L    CO2 27 21 - 32 mmol/L    Anion gap 7 7 - 16 mmol/L    Glucose 117 (H) 65 - 100 mg/dL    BUN 8 6 - 23 MG/DL    Creatinine 0.71 0.6 - 1.0 MG/DL    GFR est AA >60 >60 ml/min/1.73m2    GFR est non-AA >60 >60 ml/min/1.73m2    Calcium 8.4 8.3 - 10.4 MG/DL   CBC W/O DIFF    Collection Time: 02/04/17  8:28 AM   Result Value Ref Range    WBC 6.9 4.3 - 11.1 K/uL    RBC 4.45 4.05 - 5.25 M/uL    HGB 12.4 11.7 - 15.4 g/dL    HCT 38.6 35.8 - 46.3 %    MCV 86.7 79.6 - 97.8 FL    MCH 27.9 26.1 - 32.9 PG    MCHC 32.1 31.4 - 35.0 g/dL    RDW 12.8 11.9 - 14.6 %    PLATELET 342 314 - 007 K/uL    MPV 10.2 (L) 10.8 - 14.1 FL         Patient Instructions:   Current Discharge Medication List      START taking these medications    Details   !! rivaroxaban (XARELTO) 15 mg tab tablet Take 1 Tab by mouth two (2) times daily (with meals) for 19 days.  Then start 20mg daily  Qty: 38 Tab, Refills: 0      !! rivaroxaban (XARELTO) 20 mg tab tablet Take 1 Tab by mouth daily (with breakfast). Qty: 30 Tab, Refills: 11        !! - Potential duplicate medications found. Please discuss with provider. CONTINUE these medications which have NOT CHANGED    Details   !! escitalopram oxalate (LEXAPRO) 10 mg tablet Take 10 mg by mouth daily. !! escitalopram oxalate (LEXAPRO) 5 mg tablet Take 5 mg by mouth daily. Patient takes a 10mg and 5mg tablet to total 15mg once daily. temazepam (RESTORIL) 30 mg capsule Take 30 mg by mouth nightly. OLANZapine (ZYPREXA) 15 mg tablet Take 15 mg by mouth nightly. lamoTRIgine (LAMICTAL) 100 mg tablet Take  by mouth daily. !! - Potential duplicate medications found. Please discuss with provider.             Signed:  Vicki Duff NP  2/4/2017  2:13 PM

## 2017-02-04 NOTE — PROGRESS NOTES
Pt is resting in bed. Respirations are even and unlabored. Family is at the bedside. Respirations are even and unlabored. No signs or symptoms of distress are noted. No SOB or pain is reported at this time. Call light is within reach. Will continue to monitor.

## 2017-02-04 NOTE — PROGRESS NOTES
2/4/2017 10:45 AM    Admit Date: 2/2/2017        Subjective:     Eleonora Ruiz denies chest pain, dyspnea, palpitations. Objective:      Visit Vitals    /74    Pulse 68    Temp 97.9 °F (36.6 °C)    Resp 16    SpO2 94%       Physical Exam:  Lungs: clear to auscultation bilaterally  Abdomen: soft, non-tender.  Bowel sounds normal. No masses,  no organomegaly  Extremities: extremities normal, atraumatic, no cyanosis or edema    Data Review:   Labs:    Recent Results (from the past 24 hour(s))   METABOLIC PANEL, BASIC    Collection Time: 02/04/17  8:28 AM   Result Value Ref Range    Sodium 142 136 - 145 mmol/L    Potassium 3.8 3.5 - 5.1 mmol/L    Chloride 108 (H) 98 - 107 mmol/L    CO2 27 21 - 32 mmol/L    Anion gap 7 7 - 16 mmol/L    Glucose 117 (H) 65 - 100 mg/dL    BUN 8 6 - 23 MG/DL    Creatinine 0.71 0.6 - 1.0 MG/DL    GFR est AA >60 >60 ml/min/1.73m2    GFR est non-AA >60 >60 ml/min/1.73m2    Calcium 8.4 8.3 - 10.4 MG/DL   CBC W/O DIFF    Collection Time: 02/04/17  8:28 AM   Result Value Ref Range    WBC 6.9 4.3 - 11.1 K/uL    RBC 4.45 4.05 - 5.25 M/uL    HGB 12.4 11.7 - 15.4 g/dL    HCT 38.6 35.8 - 46.3 %    MCV 86.7 79.6 - 97.8 FL    MCH 27.9 26.1 - 32.9 PG    MCHC 32.1 31.4 - 35.0 g/dL    RDW 12.8 11.9 - 14.6 %    PLATELET 447 261 - 229 K/uL    MPV 10.2 (L) 10.8 - 14.1 FL       Telemetry: normal sinus rhythm    ECHO nl LV and RV function      Assessment:     Patient Active Problem List    Diagnosis Date Noted    Pleural effusion related to PE per Dr Allan Bhatti ECHO nl 02/04/2017    Syncope 1 month ago NSR 02/02/2017    Pulmonary embolism ([facility] [address] recurrent on xarelto 02/02/2017    Factor V Leiden ([address] 02/02/2017       Plan:   Pul states pt can go home on xarelto

## 2017-02-04 NOTE — PROGRESS NOTES
Patient given discharge instructions with time given for questions and answers. Patient given a copy of d/c instructions. Patient given prescriptions. Patient to be released to .

## 2017-08-25 ENCOUNTER — HOSPITAL ENCOUNTER (OUTPATIENT)
Dept: LAB | Age: 42
Discharge: HOME | End: 2017-08-25
Payer: COMMERCIAL

## 2017-08-25 DIAGNOSIS — D68.51 FACTOR V LEIDEN (HCC): ICD-10-CM

## 2017-08-25 DIAGNOSIS — I26.99 OTHER ACUTE PULMONARY EMBOLISM WITHOUT ACUTE COR PULMONALE (HCC): ICD-10-CM

## 2017-08-25 PROBLEM — D50.0 IRON DEFICIENCY ANEMIA DUE TO CHRONIC BLOOD LOSS: Status: ACTIVE | Noted: 2017-08-25

## 2017-08-25 LAB
ALBUMIN SERPL-MCNC: 3.3 G/DL (ref 3.5–5)
ALBUMIN/GLOB SERPL: 1 {RATIO}
ALP SERPL-CCNC: 68 U/L (ref 50–136)
ALT SERPL-CCNC: 11 U/L (ref 12–65)
ANION GAP SERPL CALC-SCNC: 6 MMOL/L
AST SERPL-CCNC: 8 U/L (ref 15–37)
BASOPHILS # BLD: 0 K/UL (ref 0–0.2)
BASOPHILS NFR BLD: 0 % (ref 0–2)
BILIRUB SERPL-MCNC: 0.3 MG/DL (ref 0.2–1.1)
BUN SERPL-MCNC: 8 MG/DL (ref 6–23)
CALCIUM SERPL-MCNC: 8.3 MG/DL (ref 8.3–10.4)
CHLORIDE SERPL-SCNC: 108 MMOL/L (ref 98–107)
CO2 SERPL-SCNC: 28 MMOL/L (ref 21–32)
CREAT SERPL-MCNC: 0.6 MG/DL (ref 0.6–1)
DIFFERENTIAL METHOD BLD: ABNORMAL
EOSINOPHIL # BLD: 0.1 K/UL (ref 0–0.8)
EOSINOPHIL NFR BLD: 2 % (ref 0.5–7.8)
ERYTHROCYTE [DISTWIDTH] IN BLOOD BY AUTOMATED COUNT: 22.2 % (ref 11.9–14.6)
FERRITIN SERPL-MCNC: 26 NG/ML (ref 8–388)
GLOBULIN SER CALC-MCNC: 3.3 G/DL
GLUCOSE SERPL-MCNC: 77 MG/DL (ref 65–100)
HCT VFR BLD AUTO: 37 % (ref 35.8–46.3)
HGB BLD-MCNC: 11.1 G/DL (ref 11.7–15.4)
IRON SATN MFR SERPL: 14 %
IRON SERPL-MCNC: 60 UG/DL (ref 35–150)
LYMPHOCYTES # BLD: 2.2 K/UL (ref 0.5–4.6)
LYMPHOCYTES NFR BLD: 31 % (ref 13–44)
MCH RBC QN AUTO: 24.3 PG (ref 26.1–32.9)
MCHC RBC AUTO-ENTMCNC: 30 G/DL (ref 31.4–35)
MCV RBC AUTO: 81.1 FL (ref 79.6–97.8)
MONOCYTES # BLD: 0.5 K/UL (ref 0.1–1.3)
MONOCYTES NFR BLD: 7 % (ref 4–12)
NEUTS SEG # BLD: 4.3 K/UL (ref 1.7–8.2)
NEUTS SEG NFR BLD: 60 % (ref 43–78)
NRBC # BLD: 0.01 K/UL (ref 0–0.2)
NRBC BLD-RTO: 0.1 PER 100 WBC (ref 0–2)
PLATELET # BLD AUTO: 269 K/UL (ref 150–450)
PMV BLD AUTO: 10.3 FL (ref 10.8–14.1)
POTASSIUM SERPL-SCNC: 4.2 MMOL/L (ref 3.5–5.1)
PROT SERPL-MCNC: 6.6 G/DL (ref 6.3–8.2)
RBC # BLD AUTO: 4.56 M/UL (ref 4.05–5.25)
SODIUM SERPL-SCNC: 142 MMOL/L (ref 136–145)
TIBC SERPL-MCNC: 440 UG/DL (ref 250–450)
WBC # BLD AUTO: 7.1 K/UL (ref 4.3–11.1)

## 2017-08-25 PROCEDURE — 83540 ASSAY OF IRON: CPT | Performed by: INTERNAL MEDICINE

## 2017-08-25 PROCEDURE — 36415 COLL VENOUS BLD VENIPUNCTURE: CPT | Performed by: INTERNAL MEDICINE

## 2017-08-25 PROCEDURE — 80053 COMPREHEN METABOLIC PANEL: CPT | Performed by: INTERNAL MEDICINE

## 2017-08-25 PROCEDURE — 85025 COMPLETE CBC W/AUTO DIFF WBC: CPT | Performed by: INTERNAL MEDICINE

## 2017-08-25 PROCEDURE — 82728 ASSAY OF FERRITIN: CPT | Performed by: INTERNAL MEDICINE

## 2017-08-25 NOTE — PROGRESS NOTES
Patient pre-assessment complete for Tilt Table Test with Dr Michaela Abernathy scheduled for 17 at 8am, arrival time 7am. Patient verified using . Patient instructed to bring all home medications in labeled bottles on the day of procedure. NPO status reinforced. Instructed they can take all other medications excluding vitamins & supplements. Patient verbalizes understanding of all instructions & denies any questions at this time.

## 2017-08-28 ENCOUNTER — HOSPITAL ENCOUNTER (OUTPATIENT)
Dept: NON INVASIVE DIAGNOSTICS | Age: 42
Discharge: HOME | End: 2017-08-28
Attending: INTERNAL MEDICINE | Admitting: INTERNAL MEDICINE
Payer: COMMERCIAL

## 2017-08-28 VITALS
SYSTOLIC BLOOD PRESSURE: 92 MMHG | RESPIRATION RATE: 24 BRPM | HEIGHT: 66 IN | HEART RATE: 67 BPM | BODY MASS INDEX: 31.18 KG/M2 | DIASTOLIC BLOOD PRESSURE: 51 MMHG | WEIGHT: 194 LBS | OXYGEN SATURATION: 91 %

## 2017-08-28 PROCEDURE — 74011250637 HC RX REV CODE- 250/637: Performed by: INTERNAL MEDICINE

## 2017-08-28 PROCEDURE — 93660 TILT TABLE EVALUATION: CPT

## 2017-08-28 PROCEDURE — 74011000258 HC RX REV CODE- 258: Performed by: INTERNAL MEDICINE

## 2017-08-28 RX ORDER — SODIUM CHLORIDE 9 MG/ML
75 INJECTION, SOLUTION INTRAVENOUS CONTINUOUS
Status: DISCONTINUED | OUTPATIENT
Start: 2017-08-28 | End: 2017-08-28 | Stop reason: HOSPADM

## 2017-08-28 RX ORDER — NITROGLYCERIN 400 UG/1
1 SPRAY ORAL AS NEEDED
Status: DISCONTINUED | OUTPATIENT
Start: 2017-08-28 | End: 2017-08-28 | Stop reason: HOSPADM

## 2017-08-28 RX ORDER — FLUDROCORTISONE ACETATE 0.1 MG/1
0.1 TABLET ORAL
Qty: 60 TAB | Refills: 1 | Status: SHIPPED | OUTPATIENT
Start: 2017-08-28 | End: 2018-01-30 | Stop reason: ALTCHOICE

## 2017-08-28 RX ADMIN — NITROGLYCERIN 1 SPRAY: 400 SPRAY ORAL at 08:46

## 2017-08-28 RX ADMIN — SODIUM CHLORIDE 75 ML/HR: 900 INJECTION, SOLUTION INTRAVENOUS at 08:00

## 2017-08-28 NOTE — PROGRESS NOTES
Tilt table performed with Dr Larry Miller. Syncopal event occurred. Patient lowered and bolus given. Vital signs now stable and no complaints of dizziness.

## 2017-08-28 NOTE — PROGRESS NOTES
Discharge instructions reviewed with patient including post tilt table instructions and new medications. INT removed with cath intact.

## 2017-08-28 NOTE — PROCEDURES
[address] 46       Name:  Santhosh Degroot   MR#:  [id number]   :  1975   Account #:  [de-identified]   Date of Adm:  2017       INDICATIONS: Syncope. PROCEDURE PERFORMED: Upright tilt table testing. DESCRIPTION OF PROCEDURE: The patient was brought to prep and   recovery lab prep and recovery for tilt table testing in a   fasting state. After signing informed consent, she was laid in   the supine tilt table position and noninvasive blood pressure   monitoring, ECG monitoring and pulse oximetry were established. The patient was observed in the supine position for 10 minutes,   during which time the patient had vital signs as listed below   and were unchanged. Heart rhythm were observed continuously and blood pressures were   measured every minute thereafter, the patient was stable   throughout the procedure. After approximately 10 minutes, she   was tilted then upright the head at 75 degrees and observed for   10 additional minutes. After 10 minutes, subcutaneous   nitroglycerin was administered. Supine:   1. 93/62, pulse 67.   2. 292/62, pulse 66.   3. 95/62, pulse 69.   4. 95/63, pulse 69.   5. 94/63, pulse 70.   6. 95/58, pulse 68. 7. 96/60, pulse 70.   8. 96/63, pulse 70.   9. 93/59, pulse 68.   10. 91/60, pulse 69. Upright tilt table position at 75 degrees:   1. 97/67, pulse 73.   2. 97/71, pulse 78.   3. 92/77, pulse 78.   4. 101/68, pulse 79.   5. 103/65, pulse 78. 6. 97/60, pulse 74.   7. 100/60, pulse 80.   8. 98/65, pulse 80.   9. 92/65, pulse of 81. Administration of subcutaneous nitroglycerin:   1. 95/68, pulse 82.   2. 101/62, pulse 88. 3. 97/71, pulse 93.   4. 87/76, pulse 98.   5. 70/49, pulse 110.   6. 65/38, pulse 122. The patient had experienced a syncopal event, nonpalpable   pulses, heart rate of excess 150 beats per minute.       The patient was then placed in the supine position with   administration of intravenous fluids through with improvement of   symptoms, resolution of syncope, normal heart rate, and blood   pressure improved. Post-syncope the patient had recovered monitoring   vital signs for 10 minutes by nursing staff. ASSESSMENT: Neurocardiogenic syncope with positive tilt table   test after the initiation of nitroglycerin. The patient will be   placed on Florinef therapy t.i.d. with followup in 6 months. BNP   be checked in 2 weeks.         Gayle Hill MD      Verneice Necessary / Tami.Conception   D:  08/28/2017   09:30   T:  08/28/2017   09:57   Job #:  [id number]

## 2017-08-28 NOTE — DISCHARGE INSTRUCTIONS
Tilt Table Test: About This Test  What is it? A tilt table test is used to check people who have fainted or who often feel lightheaded. The results help your doctor know the cause of your fainting or feeling lightheaded. The test uses a special table that slowly tilts you to an upright position. It checks how your body responds when you change positions. Why is this test done? This test checks what causes your symptoms by monitoring them while changing your position. Your doctor can see if you faint or feel lightheaded because of problems with your heart rate or blood pressure. When people move from a lying position to an upright one, their blood pressure normally drops. But the body adjusts to this. Your nervous system senses changes in body position and controls your heart rate and blood pressure. If the nervous system doesn't work properly, you might feel lightheaded or faint. This can happen if your blood pressure stays too low. Your heart rate also may slow down or speed up. You feel lightheaded because your brain is not getting a normal amount of blood for a short time. This problem is called syncope (say \"HHAO-sex-anp\"). Syncope might happen during the test when you change to an upright position. How can you prepare for the test?  · Tell your doctor about any medicines you take. Ask your doctor if you need to stop taking any medicines before the test.  · You may be asked to not eat or drink for a few hours before the test.  What happens during the test?  · The test is usually done in a hospital or a cardiologist's office. · You will have small patches or pads attached to your skin. These are sensors that monitor your heart. You will also have a blood pressure cuff on your arm. And you may have an IV. · During the test, you will lie flat on a table that can tilt you up to almost a standing position. You will be strapped securely to the table.   · Your heart rate and blood pressure are checked regularly as the table is tilted up. · You will be asked if you feel any symptoms like nausea, sweating, dizziness, or an abnormal heartbeat. If you don't have any symptoms, you may be given medicine to speed up your heart rate. Then you will be checked for symptoms again. · If you faint during the test, the table will be returned to a flat position. You will be checked closely and taken care of right away by your medical team. Most people wake up right away. What else should you know about the test?  · The test result is normal if your blood pressure stays stable during the test and you do not feel lightheaded or faint. The test result is not normal if your blood pressure drops and you feel lightheaded or faint. These symptoms might happen because of a slow heart rate. How long does the test take? · The test will take about an hour. It may take longer if you get medicine to speed up your heart during the test.  What happens after the test?  · Your heart rate and blood pressure will be checked before you go home. · You may need to have someone drive you home after the test.  · You can probably go back to your usual activities right away. But some people feel a little tired or nauseated  Follow-up care is a key part of your treatment and safety. Be sure to make and go to all appointments, and call your doctor if you are having problems. It's also a good idea to keep a list of the medicines you take. Ask your doctor when you can expect to have your test results. Where can you learn more? Go to http://denise-zarina.info/. Enter I422 in the search box to learn more about \"Tilt Table Test: About This Test.\"  Current as of: September 21, 2016  Content Version: 11.3  © 0338-8730 [employer]. Care instructions adapted under license by [employer] (which disclaims liability or warranty for this information).  If you have questions about a medical condition or this instruction, always ask your healthcare professional. [place] [address] any warranty or liability for your use of this information.

## 2017-08-28 NOTE — IP AVS SNAPSHOT
Rosalinda Paulinoольга 
 
 
 [address] [facility]  [address] [place] 
[phone] Patient: Edi Mann MRN: [id number] :1975 Discharge Summary 2017 Edi Mann MRN[de-[id number]]  [id number] Admission Information Provider Pager Service Admission Date Expected D/C Date French Robles MD  CARDIAC CATH LAB 2017 Actual LOS Patient Class 0 days OUTPATIENT Follow-up Information Follow up With Details Comments Contact Info French Robles MD On 2017 @ 10:30 am in [address] [facility] [place] [address] 
[phone] Rachel Leon MD   [address] [facility]  
[phone] Current Discharge Medication List  
  
START taking these medications Dose & Instructions Dispensing Information Comments Morning Noon Evening Bedtime  
 fludrocortisone 0.1 mg tablet Commonly known as:  FLORINEF Your last dose was: Your next dose is:    
   
   
 Dose:  0.1 mg Take 1 Tab by mouth every Monday, Wednesday, Friday. Quantity:  60 Tab Refills:  1 ASK your doctor about these medications Dose & Instructions Dispensing Information Comments Morning Noon Evening Bedtime  
 lamoTRIgine 100 mg tablet Commonly known as: LaMICtal  
   
Your last dose was: Your next dose is:    
   
   
 Dose:  200 mg Take 200 mg by mouth two (2) times a day. Refills:  0  
     
   
   
   
  
 * LEXAPRO 10 mg tablet Generic drug:  escitalopram oxalate Your last dose was: Your next dose is:    
   
   
 Dose:  10 mg Take 10 mg by mouth daily. Takes in the afternoon Refills:  0  
     
   
   
   
  
 * escitalopram oxalate 20 mg tablet Commonly known as:  Marcio Horner Your last dose was: Your next dose is: TAKE 1 TAB BY MOUTH EVERY MORNING Refills:  3  
     
   
   
   
  
 rivaroxaban 20 mg Tab tablet Commonly known as:  Sherice Payan Your last dose was: Your next dose is:    
   
   
 Dose:  20 mg Take 1 Tab by mouth daily (with breakfast). Quantity:  30 Tab Refills:  11  
     
   
   
   
  
 temazepam 30 mg capsule Commonly known as:  RESTORIL Your last dose was: Your next dose is:    
   
   
 Dose:  30 mg Take 30 mg by mouth nightly. Refills:  0 ZyPREXA 15 mg tablet Generic drug:  OLANZapine Your last dose was: Your next dose is:    
   
   
 Dose:  15 mg Take 15 mg by mouth nightly. Refills:  0  
     
   
   
   
  
 * Notice: This list has 2 medication(s) that are the same as other medications prescribed for you. Read the directions carefully, and ask your doctor or other care provider to review them with you. Where to Get Your Medications These medications were sent to [facility]/pharmacy #[id number]- [address] [facility], [address] [facility] [address] [facility]  [address] [facility] [address] Phone:  [phone]  
  fludrocortisone 0.1 mg tablet General Information Please provide this summary of care documentation to your next provider. Allergies Unspecified:  Sulfa (Sulfonamide Antibiotics) Current Immunizations  Never Reviewed No immunizations on file. Discharge Instructions Discharge Instructions Tilt Table Test: About This Test 
What is it? A tilt table test is used to check people who have fainted or who often feel lightheaded. The results help your doctor know the cause of your fainting or feeling lightheaded. The test uses a special table that slowly tilts you to an upright position. It checks how your body responds when you change positions. Why is this test done? This test checks what causes your symptoms by monitoring them while changing your position. Your doctor can see if you faint or feel lightheaded because of problems with your heart rate or blood pressure. When people move from a lying position to an upright one, their blood pressure normally drops. But the body adjusts to this. Your nervous system senses changes in body position and controls your heart rate and blood pressure. If the nervous system doesn't work properly, you might feel lightheaded or faint. This can happen if your blood pressure stays too low. Your heart rate also may slow down or speed up. You feel lightheaded because your brain is not getting a normal amount of blood for a short time. This problem is called syncope (say \"DJER-akj-oti\"). Syncope might happen during the test when you change to an upright position. How can you prepare for the test? 
· Tell your doctor about any medicines you take. Ask your doctor if you need to stop taking any medicines before the test. 
· You may be asked to not eat or drink for a few hours before the test. 
What happens during the test? 
· The test is usually done in a hospital or a cardiologist's office. · You will have small patches or pads attached to your skin. These are sensors that monitor your heart. You will also have a blood pressure cuff on your arm. And you may have an IV. · During the test, you will lie flat on a table that can tilt you up to almost a standing position. You will be strapped securely to the table. · Your heart rate and blood pressure are checked regularly as the table is tilted up. · You will be asked if you feel any symptoms like nausea, sweating, dizziness, or an abnormal heartbeat. If you don't have any symptoms, you may be given medicine to speed up your heart rate. Then you will be checked for symptoms again.  
· If you faint during the test, the table will be returned to a flat position. You will be checked closely and taken care of right away by your medical team. Most people wake up right away. What else should you know about the test? 
· The test result is normal if your blood pressure stays stable during the test and you do not feel lightheaded or faint. The test result is not normal if your blood pressure drops and you feel lightheaded or faint. These symptoms might happen because of a slow heart rate. How long does the test take? · The test will take about an hour. It may take longer if you get medicine to speed up your heart during the test. 
What happens after the test? 
· Your heart rate and blood pressure will be checked before you go home. · You may need to have someone drive you home after the test. 
· You can probably go back to your usual activities right away. But some people feel a little tired or nauseated Follow-up care is a key part of your treatment and safety. Be sure to make and go to all appointments, and call your doctor if you are having problems. It's also a good idea to keep a list of the medicines you take. Ask your doctor when you can expect to have your test results. Where can you learn more? Go to http://denise-zarina.info/. Enter F788 in the search box to learn more about \"Tilt Table Test: About This Test.\" Current as of: September 21, 2016 Content Version: 11.3 © 8936-1431 [employer]. Care instructions adapted under license by [employer] (which disclaims liability or warranty for this information). If you have questions about a medical condition or this instruction, always ask your healthcare professional. [place] [address] any warranty or liability for your use of this information. Discharge Orders None  
  
` Patient Signature:  ____________________________________________________________ Date:  ____________________________________________________________ `    
    
 Provider Signature:  ____________________________________________________________ Date:  ____________________________________________________________

## 2017-08-31 ENCOUNTER — HOSPITAL ENCOUNTER (OUTPATIENT)
Dept: INFUSION THERAPY | Age: 42
Discharge: HOME | End: 2017-08-31
Payer: COMMERCIAL

## 2017-08-31 VITALS
OXYGEN SATURATION: 97 % | DIASTOLIC BLOOD PRESSURE: 54 MMHG | SYSTOLIC BLOOD PRESSURE: 102 MMHG | RESPIRATION RATE: 18 BRPM | HEART RATE: 91 BPM | TEMPERATURE: 98.1 F

## 2017-08-31 DIAGNOSIS — D50.0 IRON DEFICIENCY ANEMIA DUE TO CHRONIC BLOOD LOSS: ICD-10-CM

## 2017-08-31 PROCEDURE — 96365 THER/PROPH/DIAG IV INF INIT: CPT

## 2017-08-31 PROCEDURE — 74011250636 HC RX REV CODE- 250/636: Performed by: INTERNAL MEDICINE

## 2017-08-31 RX ORDER — SODIUM CHLORIDE 0.9 % (FLUSH) 0.9 %
10 SYRINGE (ML) INJECTION AS NEEDED
Status: ACTIVE | OUTPATIENT
Start: 2017-08-31 | End: 2017-08-31

## 2017-08-31 RX ADMIN — FERRIC CARBOXYMALTOSE INJECTION 750 MG: 50 INJECTION, SOLUTION INTRAVENOUS at 08:53

## 2017-08-31 RX ADMIN — SODIUM CHLORIDE 500 ML: 900 INJECTION, SOLUTION INTRAVENOUS at 08:31

## 2017-08-31 RX ADMIN — Medication 10 ML: at 08:30

## 2017-08-31 NOTE — PROGRESS NOTES
Pt arrived ambulatory to C. IV established with good blood return. NS infusing. Injectafer infused. Pt monitored.

## 2017-09-07 ENCOUNTER — HOSPITAL ENCOUNTER (OUTPATIENT)
Dept: INFUSION THERAPY | Age: 42
Discharge: HOME | End: 2017-09-07
Payer: COMMERCIAL

## 2017-09-07 VITALS
DIASTOLIC BLOOD PRESSURE: 61 MMHG | OXYGEN SATURATION: 97 % | SYSTOLIC BLOOD PRESSURE: 102 MMHG | HEART RATE: 80 BPM | TEMPERATURE: 97.8 F | RESPIRATION RATE: 16 BRPM

## 2017-09-07 DIAGNOSIS — D50.0 IRON DEFICIENCY ANEMIA DUE TO CHRONIC BLOOD LOSS: ICD-10-CM

## 2017-09-07 PROCEDURE — 74011250636 HC RX REV CODE- 250/636: Performed by: INTERNAL MEDICINE

## 2017-09-07 PROCEDURE — 96365 THER/PROPH/DIAG IV INF INIT: CPT

## 2017-09-07 RX ADMIN — FERRIC CARBOXYMALTOSE INJECTION 750 MG: 50 INJECTION, SOLUTION INTRAVENOUS at 08:25

## 2017-09-07 RX ADMIN — SODIUM CHLORIDE 500 ML: 900 INJECTION, SOLUTION INTRAVENOUS at 08:22

## 2017-09-07 NOTE — PROGRESS NOTES
Arrived to the [facility]. Injectafer completed. Patient tolerated well and monitored post infusion. Any issues or concerns during appointment: NONE  Patient aware of lab appointment on 11/27 @0900   Discharged ambulatory.

## 2017-10-04 ENCOUNTER — HOSPITAL ENCOUNTER (OUTPATIENT)
Dept: LAB | Age: 42
Discharge: HOME | End: 2017-10-04
Attending: INTERNAL MEDICINE
Payer: COMMERCIAL

## 2017-10-04 LAB
ANION GAP SERPL CALC-SCNC: 5 MMOL/L
BUN SERPL-MCNC: 8 MG/DL (ref 6–23)
CALCIUM SERPL-MCNC: 8.4 MG/DL (ref 8.3–10.4)
CHLORIDE SERPL-SCNC: 110 MMOL/L (ref 98–107)
CO2 SERPL-SCNC: 27 MMOL/L (ref 21–32)
CREAT SERPL-MCNC: 0.6 MG/DL (ref 0.6–1)
GLUCOSE SERPL-MCNC: 96 MG/DL (ref 65–100)
POTASSIUM SERPL-SCNC: 3.7 MMOL/L (ref 3.5–5.1)
SODIUM SERPL-SCNC: 142 MMOL/L (ref 136–145)

## 2017-10-04 PROCEDURE — 80048 BASIC METABOLIC PNL TOTAL CA: CPT | Performed by: INTERNAL MEDICINE

## 2017-10-04 PROCEDURE — 36415 COLL VENOUS BLD VENIPUNCTURE: CPT | Performed by: INTERNAL MEDICINE

## 2018-01-30 ENCOUNTER — HOSPITAL ENCOUNTER (OUTPATIENT)
Dept: LAB | Age: 43
Discharge: HOME | End: 2018-01-30
Payer: COMMERCIAL

## 2018-01-30 DIAGNOSIS — I95.1 ORTHOSTATIC HYPOTENSION: ICD-10-CM

## 2018-01-30 LAB — TSH SERPL DL<=0.005 MIU/L-ACNC: 0.81 UIU/ML (ref 0.36–3.74)

## 2018-01-30 PROCEDURE — 36415 COLL VENOUS BLD VENIPUNCTURE: CPT | Performed by: INTERNAL MEDICINE

## 2018-01-30 PROCEDURE — 82533 TOTAL CORTISOL: CPT | Performed by: INTERNAL MEDICINE

## 2018-01-30 PROCEDURE — 84443 ASSAY THYROID STIM HORMONE: CPT | Performed by: INTERNAL MEDICINE

## 2018-01-31 LAB — CORTIS PM SERPL-MCNC: 4.7 UG/DL (ref 2–14)

## 2018-03-05 ENCOUNTER — HOSPITAL ENCOUNTER (OUTPATIENT)
Dept: SLEEP MEDICINE | Age: 43
Discharge: HOME | End: 2018-03-05

## 2018-05-16 ENCOUNTER — HOSPITAL ENCOUNTER (EMERGENCY)
Age: 43
Discharge: HOME | End: 2018-05-16
Attending: EMERGENCY MEDICINE
Payer: COMMERCIAL

## 2018-05-16 ENCOUNTER — APPOINTMENT (OUTPATIENT)
Dept: CT IMAGING | Age: 43
End: 2018-05-16
Attending: EMERGENCY MEDICINE
Payer: COMMERCIAL

## 2018-05-16 ENCOUNTER — APPOINTMENT (OUTPATIENT)
Dept: GENERAL RADIOLOGY | Age: 43
End: 2018-05-16
Attending: EMERGENCY MEDICINE
Payer: COMMERCIAL

## 2018-05-16 ENCOUNTER — APPOINTMENT (OUTPATIENT)
Dept: ULTRASOUND IMAGING | Age: 43
End: 2018-05-16
Attending: EMERGENCY MEDICINE
Payer: COMMERCIAL

## 2018-05-16 VITALS
DIASTOLIC BLOOD PRESSURE: 78 MMHG | SYSTOLIC BLOOD PRESSURE: 117 MMHG | OXYGEN SATURATION: 94 % | HEIGHT: 66 IN | BODY MASS INDEX: 27.32 KG/M2 | RESPIRATION RATE: 18 BRPM | WEIGHT: 170 LBS | HEART RATE: 84 BPM | TEMPERATURE: 98.3 F

## 2018-05-16 DIAGNOSIS — J18.9 COMMUNITY ACQUIRED PNEUMONIA OF RIGHT LOWER LOBE OF LUNG: Primary | ICD-10-CM

## 2018-05-16 LAB
ALBUMIN SERPL-MCNC: 3.6 G/DL (ref 3.5–5)
ALBUMIN/GLOB SERPL: 0.9 {RATIO} (ref 1.2–3.5)
ALP SERPL-CCNC: 198 U/L (ref 50–136)
ALT SERPL-CCNC: 75 U/L (ref 12–65)
ANION GAP SERPL CALC-SCNC: 14 MMOL/L (ref 7–16)
AST SERPL-CCNC: 58 U/L (ref 15–37)
ATRIAL RATE: 89 BPM
BASOPHILS # BLD: 0 K/UL (ref 0–0.2)
BASOPHILS NFR BLD: 0 % (ref 0–2)
BILIRUB SERPL-MCNC: 0.7 MG/DL (ref 0.2–1.1)
BUN SERPL-MCNC: 8 MG/DL (ref 6–23)
CALCIUM SERPL-MCNC: 8.9 MG/DL (ref 8.3–10.4)
CALCULATED P AXIS, ECG09: 34 DEGREES
CALCULATED R AXIS, ECG10: 24 DEGREES
CALCULATED T AXIS, ECG11: -49 DEGREES
CHLORIDE SERPL-SCNC: 102 MMOL/L (ref 98–107)
CO2 SERPL-SCNC: 27 MMOL/L (ref 21–32)
CREAT SERPL-MCNC: 0.8 MG/DL (ref 0.6–1)
D DIMER PPP FEU-MCNC: 0.58 UG/ML(FEU)
DIAGNOSIS, 93000: NORMAL
DIFFERENTIAL METHOD BLD: ABNORMAL
EOSINOPHIL # BLD: 0.1 K/UL (ref 0–0.8)
EOSINOPHIL NFR BLD: 0 % (ref 0.5–7.8)
ERYTHROCYTE [DISTWIDTH] IN BLOOD BY AUTOMATED COUNT: 13.5 % (ref 11.9–14.6)
GLOBULIN SER CALC-MCNC: 3.9 G/DL (ref 2.3–3.5)
GLUCOSE SERPL-MCNC: 114 MG/DL (ref 65–100)
HCT VFR BLD AUTO: 41.2 % (ref 35.8–46.3)
HGB BLD-MCNC: 13.2 G/DL (ref 11.7–15.4)
IMM GRANULOCYTES # BLD: 0.1 K/UL (ref 0–0.5)
IMM GRANULOCYTES NFR BLD AUTO: 0 % (ref 0–5)
LYMPHOCYTES # BLD: 1.7 K/UL (ref 0.5–4.6)
LYMPHOCYTES NFR BLD: 7 % (ref 13–44)
MCH RBC QN AUTO: 28 PG (ref 26.1–32.9)
MCHC RBC AUTO-ENTMCNC: 32 G/DL (ref 31.4–35)
MCV RBC AUTO: 87.5 FL (ref 79.6–97.8)
MONOCYTES # BLD: 0.9 K/UL (ref 0.1–1.3)
MONOCYTES NFR BLD: 4 % (ref 4–12)
NEUTS SEG # BLD: 23.4 K/UL (ref 1.7–8.2)
NEUTS SEG NFR BLD: 89 % (ref 43–78)
P-R INTERVAL, ECG05: 112 MS
PLATELET # BLD AUTO: 361 K/UL (ref 150–450)
PMV BLD AUTO: 10.5 FL (ref 10.8–14.1)
POTASSIUM SERPL-SCNC: 3 MMOL/L (ref 3.5–5.1)
PROT SERPL-MCNC: 7.5 G/DL (ref 6.3–8.2)
Q-T INTERVAL, ECG07: 360 MS
QRS DURATION, ECG06: 84 MS
QTC CALCULATION (BEZET), ECG08: 438 MS
RBC # BLD AUTO: 4.71 M/UL (ref 4.05–5.25)
SODIUM SERPL-SCNC: 143 MMOL/L (ref 136–145)
TROPONIN I BLD-MCNC: 0 NG/ML (ref 0.02–0.05)
VENTRICULAR RATE, ECG03: 89 BPM
WBC # BLD AUTO: 26.2 K/UL (ref 4.3–11.1)

## 2018-05-16 PROCEDURE — 93005 ELECTROCARDIOGRAM TRACING: CPT | Performed by: EMERGENCY MEDICINE

## 2018-05-16 PROCEDURE — 81025 URINE PREGNANCY TEST: CPT | Performed by: EMERGENCY MEDICINE

## 2018-05-16 PROCEDURE — 93971 EXTREMITY STUDY: CPT

## 2018-05-16 PROCEDURE — 74011636320 HC RX REV CODE- 636/320: Performed by: EMERGENCY MEDICINE

## 2018-05-16 PROCEDURE — 85379 FIBRIN DEGRADATION QUANT: CPT | Performed by: EMERGENCY MEDICINE

## 2018-05-16 PROCEDURE — 74011250637 HC RX REV CODE- 250/637: Performed by: EMERGENCY MEDICINE

## 2018-05-16 PROCEDURE — 96365 THER/PROPH/DIAG IV INF INIT: CPT | Performed by: EMERGENCY MEDICINE

## 2018-05-16 PROCEDURE — 84484 ASSAY OF TROPONIN QUANT: CPT

## 2018-05-16 PROCEDURE — 74011000250 HC RX REV CODE- 250: Performed by: EMERGENCY MEDICINE

## 2018-05-16 PROCEDURE — 74011250636 HC RX REV CODE- 250/636: Performed by: EMERGENCY MEDICINE

## 2018-05-16 PROCEDURE — 71260 CT THORAX DX C+: CPT

## 2018-05-16 PROCEDURE — 85025 COMPLETE CBC W/AUTO DIFF WBC: CPT | Performed by: EMERGENCY MEDICINE

## 2018-05-16 PROCEDURE — 81025 URINE PREGNANCY TEST: CPT

## 2018-05-16 PROCEDURE — 71045 X-RAY EXAM CHEST 1 VIEW: CPT

## 2018-05-16 PROCEDURE — 99285 EMERGENCY DEPT VISIT HI MDM: CPT | Performed by: EMERGENCY MEDICINE

## 2018-05-16 PROCEDURE — 80053 COMPREHEN METABOLIC PANEL: CPT | Performed by: EMERGENCY MEDICINE

## 2018-05-16 PROCEDURE — 94640 AIRWAY INHALATION TREATMENT: CPT

## 2018-05-16 PROCEDURE — 77030013140 HC MSK NEB VYRM -A

## 2018-05-16 PROCEDURE — 74011000258 HC RX REV CODE- 258: Performed by: EMERGENCY MEDICINE

## 2018-05-16 PROCEDURE — 94664 DEMO&/EVAL PT USE INHALER: CPT

## 2018-05-16 RX ORDER — SODIUM CHLORIDE 0.9 % (FLUSH) 0.9 %
10 SYRINGE (ML) INJECTION
Status: COMPLETED | OUTPATIENT
Start: 2018-05-16 | End: 2018-05-16

## 2018-05-16 RX ORDER — IPRATROPIUM BROMIDE AND ALBUTEROL SULFATE 2.5; .5 MG/3ML; MG/3ML
3 SOLUTION RESPIRATORY (INHALATION)
Status: COMPLETED | OUTPATIENT
Start: 2018-05-16 | End: 2018-05-16

## 2018-05-16 RX ORDER — LEVOFLOXACIN 750 MG/1
750 TABLET ORAL DAILY
Qty: 7 TAB | Refills: 0 | Status: SHIPPED | OUTPATIENT
Start: 2018-05-16 | End: 2019-06-10 | Stop reason: ALTCHOICE

## 2018-05-16 RX ORDER — ALBUTEROL SULFATE 90 UG/1
2 AEROSOL, METERED RESPIRATORY (INHALATION)
Qty: 1 INHALER | Refills: 4 | Status: SHIPPED | OUTPATIENT
Start: 2018-05-16

## 2018-05-16 RX ADMIN — SODIUM CHLORIDE 100 ML: 900 INJECTION, SOLUTION INTRAVENOUS at 16:30

## 2018-05-16 RX ADMIN — IOPAMIDOL 100 ML: 755 INJECTION, SOLUTION INTRAVENOUS at 16:30

## 2018-05-16 RX ADMIN — Medication 10 ML: at 16:30

## 2018-05-16 RX ADMIN — LEVOFLOXACIN 750 MG: 500 TABLET, FILM COATED ORAL at 18:02

## 2018-05-16 RX ADMIN — SODIUM CHLORIDE 1000 ML: 900 INJECTION, SOLUTION INTRAVENOUS at 16:54

## 2018-05-16 RX ADMIN — IPRATROPIUM BROMIDE AND ALBUTEROL SULFATE 3 ML: 2.5; .5 SOLUTION RESPIRATORY (INHALATION) at 17:16

## 2018-05-16 RX ADMIN — CEFTRIAXONE SODIUM 2 G: 2 INJECTION, POWDER, FOR SOLUTION INTRAMUSCULAR; INTRAVENOUS at 16:55

## 2018-05-16 NOTE — ED PROVIDER NOTES
HPI Comments: Patient is a 53-year-old female with history of factor V Leiden on xarelto who is presenting with left lower extremity swelling, cough and shortness of breath which began acutely at 2 AM last night. She had a tummy tuck about 2 weeks ago and was bridged from Lovenox at 2 Xarelto. She also has a history of pneumonia. She denies any significant fevers. She reports pleuritic type chest pain. Patient is a 38 y.o. female presenting with cough and lower extremity edema. The history is provided by the patient. No  was used. Cough   Associated symptoms include shortness of breath. Pertinent negatives include no headaches, no sore throat and no confusion. Leg Swelling    Pertinent negatives include no back pain. Past Medical History:   Diagnosis Date    Bipolar disorder, unspecified ([facility] [address]     [facility]     Psychiatric disorder     Pulmonary embolism (HCC)        Past Surgical History:   Procedure Laterality Date    HX  SECTION      HX GASTRIC BYPASS      HX GASTRIC BYPASS      HX MYOMECTOMY           Family History:   Problem Relation Age of Onset    Stroke Father     Hypertension Father     Diabetes Father        Social History     Social History    Marital status:      Spouse name: N/A    Number of children: N/A    Years of education: N/A     Occupational History    Not on file. Social History Main Topics    Smoking status: Never Smoker    Smokeless tobacco: Never Used    Alcohol use No    Drug use: No    Sexual activity: Not on file     Other Topics Concern    Not on file     Social History Narrative         ALLERGIES: Sulfa (sulfonamide antibiotics)    Review of Systems   Constitutional: Negative for fatigue and fever. HENT: Negative for sore throat. Respiratory: Positive for cough and shortness of breath. Negative for chest tightness. Cardiovascular: Positive for leg swelling. Gastrointestinal: Negative for abdominal pain. Genitourinary: Negative for dysuria. Musculoskeletal: Negative for back pain. Neurological: Negative for syncope and headaches. Psychiatric/Behavioral: Negative for confusion. Vitals:    05/16/18 1430   BP: 106/73   Pulse: 98   Resp: 20   Temp: 98.3 °F (36.8 °C)   SpO2: 92%   Weight: 77.1 kg (170 lb)   Height: 5' 6\" (1.676 m)            Physical Exam   Constitutional: She is oriented to person, place, and time. She appears well-developed and well-nourished. No distress. HENT:   Head: Normocephalic and atraumatic. Eyes: Conjunctivae and EOM are normal. Pupils are equal, round, and reactive to light. Neck: Normal range of motion. Neck supple. Cardiovascular: Normal rate, regular rhythm and normal heart sounds. Pulmonary/Chest: Effort normal and breath sounds normal. No respiratory distress. She has no wheezes. She has no rales. Course breath sounds noted bilaterally. Abdominal: Soft. She exhibits no distension. There is no tenderness. There is no rebound. Musculoskeletal: Normal range of motion. She exhibits no edema or tenderness. The left leg is possibly slightly swollen in comparison to the right. No erythema. Intact distal pulses. Neurological: She is alert and oriented to person, place, and time. Skin: Skin is warm and dry. No rash noted. She is not diaphoretic. Psychiatric: She has a normal mood and affect. Her behavior is normal.   Vitals reviewed. MDM  Number of Diagnoses or Management Options  Community acquired pneumonia of right lower lobe of lung [facility]):   Diagnosis management comments: Patient improved with breathing treatment here in the ED x-ray and CT scan demonstrated right lower lobe pneumonia. Duplex negative for any DVT. Patient given ceftriaxone first dose of Levaquin here in the ED. Sent home on Levaquin and albuterol.   She states she feels well up to go home and is good oxygen saturation stable vitals at time of discharge. Return precautions discussed. Strongly advised she follow up with primary care provider in one to 3 days.        Amount and/or Complexity of Data Reviewed  Clinical lab tests: reviewed and ordered (Results for orders placed or performed during the hospital encounter of 05/16/18  -CBC WITH AUTOMATED DIFF       Result                                            Value                         Ref Range                       WBC                                               26.2 (H)                      4.3 - 11.1 K/uL                 RBC                                               4.71                          4.05 - 5.25 M/uL                HGB                                               13.2                          11.7 - 15.4 g/dL                HCT                                               41.2                          35.8 - 46.3 %                   MCV                                               87.5                          79.6 - 97.8 FL                  MCH                                               28.0                          26.1 - 32.9 PG                  MCHC                                              32.0                          31.4 - 35.0 g/dL                RDW                                               13.5                          11.9 - 14.6 %                   PLATELET                                          361                           150 - 450 K/uL                  MPV                                               10.5 (L)                      10.8 - 14.1 FL                  DF                                                AUTOMATED                                                     NEUTROPHILS                                       89 (H)                        43 - 78 %                       LYMPHOCYTES                                       7 (L)                         13 - 44 %                       MONOCYTES 4                             4.0 - 12.0 %                    EOSINOPHILS                                       0 (L)                         0.5 - 7.8 %                     BASOPHILS                                         0                             0.0 - 2.0 %                     IMMATURE GRANULOCYTES                             0                             0.0 - 5.0 %                     ABS. NEUTROPHILS                                  23.4 (H)                      1.7 - 8.2 K/UL                  ABS. LYMPHOCYTES                                  1.7                           0.5 - 4.6 K/UL                  ABS. MONOCYTES                                    0.9                           0.1 - 1.3 K/UL                  ABS. EOSINOPHILS                                  0.1                           0.0 - 0.8 K/UL                  ABS. BASOPHILS                                    0.0                           0.0 - 0.2 K/UL                  ABS. IMM.  GRANS.                                  0.1                           0.0 - 0.5 K/UL             -METABOLIC PANEL, COMPREHENSIVE       Result                                            Value                         Ref Range                       Sodium                                            143                           136 - 145 mmol/L                Potassium                                         3.0 (L)                       3.5 - 5.1 mmol/L                Chloride                                          102                           98 - 107 mmol/L                 CO2                                               27                            21 - 32 mmol/L                  Anion gap                                         14                            7 - 16 mmol/L                   Glucose                                           114 (H)                       65 - 100 mg/dL                  BUN                                               8 6 - 23 MG/DL                    Creatinine                                        0.80                          0.6 - 1.0 MG/DL                 GFR est AA                                        >60                           >60 ml/min/1.73m2               GFR est non-AA                                    >60                           >60 ml/min/1.73m2               Calcium                                           8.9                           8.3 - 10.4 MG/DL                Bilirubin, total                                  0.7                           0.2 - 1.1 MG/DL                 ALT (SGPT)                                        75 (H)                        12 - 65 U/L                     AST (SGOT)                                        58 (H)                        15 - 37 U/L                     Alk. phosphatase                                  198 (H)                       50 - 136 U/L                    Protein, total                                    7.5                           6.3 - 8.2 g/dL                  Albumin                                           3.6                           3.5 - 5.0 g/dL                  Globulin                                          3.9 (H)                       2.3 - 3.5 g/dL                  A-G Ratio                                         0.9 (L)                       1.2 - 3.5                  -D DIMER       Result                                            Value                         Ref Range                       D DIMER                                           0.58 (HH)                     <0.56 ug/ml(FEU)           -POC TROPONIN-I       Result                                            Value                         Ref Range                       Troponin-I (POC)                                  0 (L)                         0.02 - 0.05 ng/ml          -HCG URINE, QL. - POC       Result Value                         Ref Range                       Pregnancy test,urine (POC)                        POSITIVE (A)                  NEG                        -EKG, 12 LEAD, INITIAL       Result                                            Value                         Ref Range                       Ventricular Rate                                  89                            BPM                             Atrial Rate                                       89                            BPM                             P-R Interval                                      112                           ms                              QRS Duration                                      84                            ms                              Q-T Interval                                      360                           ms                              QTC Calculation (Bezet)                           438                           ms                              Calculated P Axis                                 34                            degrees                         Calculated R Axis                                 24                            degrees                         Calculated T Axis                                 -49                           degrees                         Diagnosis                                                                                                   Normal sinus rhythm   ST & T wave abnormality, consider inferior ischemia   ST & T wave abnormality, consider anterior ischemia   Abnormal ECG   No previous ECGs available   Confirmed by SERG BERNARDO ()Germaine ([id number]) on 5/16/2018 4:13:03 PM     )  Tests in the radiology section of CPT®: ordered and reviewed (Xr Chest Sngl V    Result Date: 5/16/2018  Chest X-ray INDICATION:   Chest tightness and cough for several hours A portable AP view of the chest was obtained. FINDINGS: There is infiltrate in the right lung base. Small right effusion may also be present. The heart size is normal.  The bony thorax is intact. IMPRESSION: Right lower lobe infiltrate and possible effusion     Ct Chest W Cont    Result Date: 5/16/2018  Chest CT INDICATION:  Leg swelling and shortness of breath Multiple axial images were obtained through the chest during intravenous infusion of 100mL of Isovue 370. Coronal reformats were also evaluated. Radiation dose reduction techniques were used for this study: All CT scans performed at this facility use one or all of the following: Automated exposure control, adjustment of the mA and/or kVp according to patient's size, iterative reconstruction. FINDINGS: There is normal enhancement of the major pulmonary vessels. There is no CT evidence of pulmonary embolus. There is also normal enhancement of the aorta. There is no dissection or aneurysm. There is consolidation of the posterior lateral aspect of the right lower lobe. Patchy airspace disease also noted in the right upper lobe. There is a tiny left pleural effusion. There is no significant mediastinal or axillary adenopathy. There are no bony lesions. There are gastric bypass changes. There are no acute findings in the upper abdomen. IMPRESSION: 1. No CT evidence of pulmonary embolus. 2.  Right lower lobe pneumonia. Duplex Lower Ext Venous Left    Result Date: 5/16/2018  Left lower extremity venous ultrasound INDICATION:  Pain and swelling, Doppler ultrasound of the left lower extremity was performed. FINDINGS:  There is normal flow in the common femoral, superficial femoral, and popliteal veins. Normal compression and augmentation is demonstrated. The proximal calf veins are also patent.      IMPRESSION: No evidence of deep venous thrombosis in the left lower extremity     )  Review and summarize past medical records: yes  Independent visualization of images, tracings, or specimens: yes    Risk of Complications, Morbidity, and/or Mortality  Presenting problems: high  Diagnostic procedures: moderate  Management options: moderate    Patient Progress  Patient progress: improved        ED Course   Comment By Time   Patient is not pregnant. Urine pregnancy is from another patient and was incorrectly charted by nursing staff. The patient denies any possibility of pregnancy.  Young Goodson MD 05/16 [id number]       Procedures

## 2018-05-16 NOTE — DISCHARGE INSTRUCTIONS

## 2018-05-16 NOTE — ED TRIAGE NOTES
Patient with PMH PE 5 years ago and again 2 years ago. Patient s/p abdominoplasty 2 weeks ago. Patient c/o pain and swelling to left leg, cough and tightness in chest with inspiration. Patient is on Xarelto.

## 2018-05-16 NOTE — ED NOTES
I have reviewed discharge instructions with the patient and sister. The patient and sister verbalized understanding. Patient left ED via Discharge Method: ambulatory to Home with sister. Opportunity for questions and clarification provided. Patient given 2 scripts. To continue your aftercare when you leave the hospital, you may receive an automated call from our care team to check in on how you are doing. This is a free service and part of our promise to provide the best care and service to meet your aftercare needs.  If you have questions, or wish to unsubscribe from this service please call [phone]. [facility].

## 2018-05-17 LAB — HCG UR QL: NEGATIVE

## 2018-07-09 ENCOUNTER — HOSPITAL ENCOUNTER (OUTPATIENT)
Dept: LAB | Age: 43
Discharge: HOME | End: 2018-07-09

## 2018-07-09 PROCEDURE — 88305 TISSUE EXAM BY PATHOLOGIST: CPT | Performed by: INTERNAL MEDICINE

## 2018-07-09 PROCEDURE — 88312 SPECIAL STAINS GROUP 1: CPT | Performed by: INTERNAL MEDICINE

## 2019-06-10 ENCOUNTER — HOSPITAL ENCOUNTER (OUTPATIENT)
Dept: LAB | Age: 44
Discharge: HOME | End: 2019-06-10
Payer: COMMERCIAL

## 2019-06-10 DIAGNOSIS — D50.0 IRON DEFICIENCY ANEMIA DUE TO CHRONIC BLOOD LOSS: ICD-10-CM

## 2019-06-10 LAB
ALBUMIN SERPL-MCNC: 3.4 G/DL (ref 3.5–5)
ALBUMIN/GLOB SERPL: 0.9 {RATIO} (ref 1.2–3.5)
ALP SERPL-CCNC: 55 U/L (ref 50–136)
ALT SERPL-CCNC: 14 U/L (ref 12–65)
ANION GAP SERPL CALC-SCNC: 9 MMOL/L (ref 7–16)
AST SERPL-CCNC: 10 U/L (ref 15–37)
BASOPHILS # BLD: 0 K/UL (ref 0–0.2)
BASOPHILS NFR BLD: 0 % (ref 0–2)
BILIRUB SERPL-MCNC: 0.3 MG/DL (ref 0.2–1.1)
BUN SERPL-MCNC: 9 MG/DL (ref 6–23)
CALCIUM SERPL-MCNC: 8.9 MG/DL (ref 8.3–10.4)
CHLORIDE SERPL-SCNC: 107 MMOL/L (ref 98–107)
CO2 SERPL-SCNC: 24 MMOL/L (ref 21–32)
CREAT SERPL-MCNC: 0.62 MG/DL (ref 0.6–1)
DIFFERENTIAL METHOD BLD: ABNORMAL
EOSINOPHIL # BLD: 0 K/UL (ref 0–0.8)
EOSINOPHIL NFR BLD: 1 % (ref 0.5–7.8)
ERYTHROCYTE [DISTWIDTH] IN BLOOD BY AUTOMATED COUNT: 14.6 % (ref 11.9–14.6)
FERRITIN SERPL-MCNC: 4 NG/ML (ref 8–388)
GLOBULIN SER CALC-MCNC: 3.7 G/DL (ref 2.3–3.5)
GLUCOSE SERPL-MCNC: 90 MG/DL (ref 65–100)
HCT VFR BLD AUTO: 34.9 % (ref 35.8–46.3)
HGB BLD-MCNC: 10.6 G/DL (ref 11.7–15.4)
IMM GRANULOCYTES # BLD AUTO: 0 K/UL (ref 0–0.5)
IMM GRANULOCYTES NFR BLD AUTO: 0 % (ref 0–5)
IRON SATN MFR SERPL: 5 %
IRON SERPL-MCNC: 26 UG/DL (ref 35–150)
LYMPHOCYTES # BLD: 2.1 K/UL (ref 0.5–4.6)
LYMPHOCYTES NFR BLD: 30 % (ref 13–44)
MCH RBC QN AUTO: 24.4 PG (ref 26.1–32.9)
MCHC RBC AUTO-ENTMCNC: 30.4 G/DL (ref 31.4–35)
MCV RBC AUTO: 80.2 FL (ref 79.6–97.8)
MONOCYTES # BLD: 0.4 K/UL (ref 0.1–1.3)
MONOCYTES NFR BLD: 6 % (ref 4–12)
NEUTS SEG # BLD: 4.5 K/UL (ref 1.7–8.2)
NEUTS SEG NFR BLD: 63 % (ref 43–78)
NRBC # BLD: 0 K/UL (ref 0–0.2)
PLATELET # BLD AUTO: 314 K/UL (ref 150–450)
PMV BLD AUTO: 10.7 FL (ref 9.4–12.3)
POTASSIUM SERPL-SCNC: 4 MMOL/L (ref 3.5–5.1)
PROT SERPL-MCNC: 7.1 G/DL (ref 6.3–8.2)
RBC # BLD AUTO: 4.35 M/UL (ref 4.05–5.25)
SODIUM SERPL-SCNC: 140 MMOL/L (ref 136–145)
TIBC SERPL-MCNC: 505 UG/DL (ref 250–450)
WBC # BLD AUTO: 7.1 K/UL (ref 4.3–11.1)

## 2019-06-10 PROCEDURE — 85025 COMPLETE CBC W/AUTO DIFF WBC: CPT

## 2019-06-10 PROCEDURE — 36415 COLL VENOUS BLD VENIPUNCTURE: CPT

## 2019-06-10 PROCEDURE — 82728 ASSAY OF FERRITIN: CPT

## 2019-06-10 PROCEDURE — 83540 ASSAY OF IRON: CPT

## 2019-06-10 PROCEDURE — 80053 COMPREHEN METABOLIC PANEL: CPT

## 2019-06-14 ENCOUNTER — HOSPITAL ENCOUNTER (OUTPATIENT)
Dept: INFUSION THERAPY | Age: 44
Discharge: HOME | End: 2019-06-14
Payer: COMMERCIAL

## 2019-06-14 VITALS
RESPIRATION RATE: 16 BRPM | OXYGEN SATURATION: 98 % | SYSTOLIC BLOOD PRESSURE: 103 MMHG | DIASTOLIC BLOOD PRESSURE: 64 MMHG | HEART RATE: 85 BPM | TEMPERATURE: 98.7 F

## 2019-06-14 DIAGNOSIS — D50.0 IRON DEFICIENCY ANEMIA DUE TO CHRONIC BLOOD LOSS: Primary | ICD-10-CM

## 2019-06-14 PROCEDURE — 96374 THER/PROPH/DIAG INJ IV PUSH: CPT

## 2019-06-14 PROCEDURE — 74011250636 HC RX REV CODE- 250/636: Performed by: INTERNAL MEDICINE

## 2019-06-14 PROCEDURE — 74011000258 HC RX REV CODE- 258: Performed by: INTERNAL MEDICINE

## 2019-06-14 RX ORDER — SODIUM CHLORIDE 0.9 % (FLUSH) 0.9 %
10 SYRINGE (ML) INJECTION AS NEEDED
Status: DISCONTINUED | OUTPATIENT
Start: 2019-06-14 | End: 2019-06-15 | Stop reason: HOSPADM

## 2019-06-14 RX ADMIN — SODIUM CHLORIDE 500 ML: 900 INJECTION, SOLUTION INTRAVENOUS at 17:30

## 2019-06-14 RX ADMIN — Medication 10 ML: at 17:05

## 2019-06-14 RX ADMIN — FERUMOXYTOL 510 MG: 510 INJECTION INTRAVENOUS at 17:15

## 2019-06-14 NOTE — PROGRESS NOTES
Pt arrived ambulatory to [facility]. IV established with good blood return. NS infusing. Feraheme infused. Pt monitored. Pt aware of next appt on 6/28/19 at 1700. Tolerated well. IV discontinued with tip intact. Pt discharged ambulatory.

## 2019-06-21 ENCOUNTER — APPOINTMENT (OUTPATIENT)
Dept: INFUSION THERAPY | Age: 44
End: 2019-06-21
Payer: COMMERCIAL

## 2019-06-28 ENCOUNTER — HOSPITAL ENCOUNTER (OUTPATIENT)
Dept: INFUSION THERAPY | Age: 44
Discharge: HOME | End: 2019-06-28
Payer: COMMERCIAL

## 2019-06-28 VITALS
HEART RATE: 88 BPM | DIASTOLIC BLOOD PRESSURE: 63 MMHG | TEMPERATURE: 97.8 F | SYSTOLIC BLOOD PRESSURE: 96 MMHG | OXYGEN SATURATION: 98 % | RESPIRATION RATE: 16 BRPM

## 2019-06-28 DIAGNOSIS — D50.0 IRON DEFICIENCY ANEMIA DUE TO CHRONIC BLOOD LOSS: Primary | ICD-10-CM

## 2019-06-28 PROCEDURE — 74011250636 HC RX REV CODE- 250/636: Performed by: INTERNAL MEDICINE

## 2019-06-28 PROCEDURE — 96374 THER/PROPH/DIAG INJ IV PUSH: CPT

## 2019-06-28 PROCEDURE — 74011000258 HC RX REV CODE- 258: Performed by: INTERNAL MEDICINE

## 2019-06-28 RX ORDER — SODIUM CHLORIDE 0.9 % (FLUSH) 0.9 %
10 SYRINGE (ML) INJECTION AS NEEDED
Status: DISCONTINUED | OUTPATIENT
Start: 2019-06-28 | End: 2019-06-29 | Stop reason: HOSPADM

## 2019-06-28 RX ADMIN — SODIUM CHLORIDE 500 ML: 900 INJECTION, SOLUTION INTRAVENOUS at 17:10

## 2019-06-28 RX ADMIN — FERUMOXYTOL 510 MG: 510 INJECTION INTRAVENOUS at 17:20

## 2019-06-28 NOTE — PROGRESS NOTES
Arrived to the [facility]. Assessment and Feraheme completed. Patient tolerated well. Any issues or concerns during appointment: No.  Discharged ambulatory with mother.

## 2019-09-20 ENCOUNTER — HOSPITAL ENCOUNTER (OUTPATIENT)
Dept: LAB | Age: 44
Discharge: HOME | End: 2019-09-20
Payer: COMMERCIAL

## 2019-09-20 DIAGNOSIS — D50.0 IRON DEFICIENCY ANEMIA DUE TO CHRONIC BLOOD LOSS: ICD-10-CM

## 2019-09-20 LAB
ALBUMIN SERPL-MCNC: 3.7 G/DL (ref 3.5–5)
ALBUMIN/GLOB SERPL: 1.1 {RATIO} (ref 1.2–3.5)
ALP SERPL-CCNC: 51 U/L (ref 50–136)
ALT SERPL-CCNC: 12 U/L (ref 12–65)
ANION GAP SERPL CALC-SCNC: 10 MMOL/L (ref 7–16)
AST SERPL-CCNC: 7 U/L (ref 15–37)
BASOPHILS # BLD: 0 K/UL (ref 0–0.2)
BASOPHILS NFR BLD: 0 % (ref 0–2)
BILIRUB SERPL-MCNC: 0.4 MG/DL (ref 0.2–1.1)
BUN SERPL-MCNC: 6 MG/DL (ref 6–23)
CALCIUM SERPL-MCNC: 8.9 MG/DL (ref 8.3–10.4)
CHLORIDE SERPL-SCNC: 110 MMOL/L (ref 98–107)
CO2 SERPL-SCNC: 23 MMOL/L (ref 21–32)
CREAT SERPL-MCNC: 0.7 MG/DL (ref 0.6–1)
DIFFERENTIAL METHOD BLD: NORMAL
EOSINOPHIL # BLD: 0.1 K/UL (ref 0–0.8)
EOSINOPHIL NFR BLD: 2 % (ref 0.5–7.8)
ERYTHROCYTE [DISTWIDTH] IN BLOOD BY AUTOMATED COUNT: 14.6 % (ref 11.9–14.6)
FERRITIN SERPL-MCNC: 92 NG/ML (ref 8–388)
GLOBULIN SER CALC-MCNC: 3.4 G/DL (ref 2.3–3.5)
GLUCOSE SERPL-MCNC: 94 MG/DL (ref 65–100)
HCT VFR BLD AUTO: 41.1 % (ref 35.8–46.3)
HGB BLD-MCNC: 13.3 G/DL (ref 11.7–15.4)
IMM GRANULOCYTES # BLD AUTO: 0 K/UL (ref 0–0.5)
IMM GRANULOCYTES NFR BLD AUTO: 0 % (ref 0–5)
IRON SATN MFR SERPL: 28 %
IRON SERPL-MCNC: 83 UG/DL (ref 35–150)
LYMPHOCYTES # BLD: 1.9 K/UL (ref 0.5–4.6)
LYMPHOCYTES NFR BLD: 27 % (ref 13–44)
MCH RBC QN AUTO: 27.6 PG (ref 26.1–32.9)
MCHC RBC AUTO-ENTMCNC: 32.4 G/DL (ref 31.4–35)
MCV RBC AUTO: 85.3 FL (ref 79.6–97.8)
MONOCYTES # BLD: 0.4 K/UL (ref 0.1–1.3)
MONOCYTES NFR BLD: 6 % (ref 4–12)
NEUTS SEG # BLD: 4.5 K/UL (ref 1.7–8.2)
NEUTS SEG NFR BLD: 64 % (ref 43–78)
NRBC # BLD: 0 K/UL (ref 0–0.2)
PLATELET # BLD AUTO: 243 K/UL (ref 150–450)
PMV BLD AUTO: 10.3 FL (ref 9.4–12.3)
POTASSIUM SERPL-SCNC: 3.3 MMOL/L (ref 3.5–5.1)
PROT SERPL-MCNC: 7.1 G/DL (ref 6.3–8.2)
RBC # BLD AUTO: 4.82 M/UL (ref 4.05–5.25)
SODIUM SERPL-SCNC: 143 MMOL/L (ref 136–145)
TIBC SERPL-MCNC: 299 UG/DL (ref 250–450)
WBC # BLD AUTO: 7 K/UL (ref 4.3–11.1)

## 2019-09-20 PROCEDURE — 83540 ASSAY OF IRON: CPT

## 2019-09-20 PROCEDURE — 36415 COLL VENOUS BLD VENIPUNCTURE: CPT

## 2019-09-20 PROCEDURE — 82728 ASSAY OF FERRITIN: CPT

## 2019-09-20 PROCEDURE — 80053 COMPREHEN METABOLIC PANEL: CPT

## 2019-09-20 PROCEDURE — 85025 COMPLETE CBC W/AUTO DIFF WBC: CPT

## 2020-04-17 NOTE — IP AVS SNAPSHOT
Hilario Gaines 
 
 
 [address] 
[phone] Patient: Иван Maynard MRN: [id number] :1975 You are allergic to the following Allergen Reactions Sulfa (Sulfonamide Antibiotics) Unknown (comments) Recent Documentation Smoking Status Never Smoker Emergency Contacts Name Discharge Info Relation Home Work Mobile Milena Helms  Spouse [3] [phone] About your hospitalization You were admitted on:  2017 You last received care in the:  [facility] 8 MED SURG You were discharged on:  2017 Unit phone number:  [phone] Why you were hospitalized Your primary diagnosis was:  Pulmonary Embolism (Hcc) Your diagnoses also included:  Syncope, Factor V Leiden (Hcc), Pleural Effusion Providers Seen During Your Hospitalizations Provider Role Specialty Primary office phone Samy mEmanuel MD Attending Provider Cardiology [phone] Your Primary Care Physician (PCP) Primary Care Physician Office Phone Office Fax [address] Follow-up Information Follow up With Details Comments Contact Info Seymour Boxer, MD   [address] [facility]  
[phone] Gordon Portillo MD In 1 month please arrange new consult outpatient with heme Dx hx of factor V leiden and now with multiple right sided PEs [address] [address] [address] 
[phone] Current Discharge Medication List  
  
START taking these medications Dose & Instructions Dispensing Information Comments Morning Noon Evening Bedtime * rivaroxaban 15 mg Tab tablet Commonly known as:  Samantha Xiao Your next dose is: Today, Tomorrow Other:  _________  Dose:  15 mg  
 Take 1 Tab by mouth two (2) times daily (with meals) for 19 days. Quantity:  38 Tab Refills:  0  
     
   
   
   
  
 * rivaroxaban 20 mg Tab tablet Commonly known as:  Rogelio Sullivan Start taking on:  2/24/2017 Your next dose is: Today, Tomorrow Other:  _________ Dose:  20 mg Take 1 Tab by mouth daily (with breakfast). Quantity:  30 Tab Refills:  11 * Notice: This list has 2 medication(s) that are the same as other medications prescribed for you. Read the directions carefully, and ask your doctor or other care provider to review them with you. CONTINUE these medications which have NOT CHANGED Dose & Instructions Dispensing Information Comments Morning Noon Evening Bedtime  
 lamoTRIgine 100 mg tablet Commonly known as: LaMICtal  
   
Your next dose is: Today, Tomorrow Other:  _________ Take  by mouth daily. Refills:  0  
     
   
   
   
  
 * LEXAPRO 10 mg tablet Generic drug:  escitalopram oxalate Your next dose is: Today, Tomorrow Other:  _________ Dose:  10 mg Take 10 mg by mouth daily. Refills:  0  
     
   
   
   
  
 * LEXAPRO 5 mg tablet Generic drug:  escitalopram oxalate Your next dose is: Today, Tomorrow Other:  _________ Dose:  5 mg Take 5 mg by mouth daily. Patient takes a 10mg and 5mg tablet to total 15mg once daily. Refills:  0  
     
   
   
   
  
 temazepam 30 mg capsule Commonly known as:  RESTORIL Your next dose is: Today, Tomorrow Other:  _________ Dose:  30 mg Take 30 mg by mouth nightly. Refills:  0 ZyPREXA 15 mg tablet Generic drug:  OLANZapine Your next dose is: Today, Tomorrow Other:  _________ Dose:  15 mg Take 15 mg by mouth nightly. Refills:  0  
     
   
   
   
  
 * Notice:   This list has 2 medication(s) that are the same as other medications prescribed for you. Read the directions carefully, and ask your doctor or other care provider to review them with you. Where to Get Your Medications Information on where to get these meds will be given to you by the nurse or doctor. ! Ask your nurse or doctor about these medications  
  rivaroxaban 15 mg Tab tablet  
 rivaroxaban 20 mg Tab tablet Discharge Instructions DISCHARGE SUMMARY from Nurse The following personal items are in your possession at time of discharge: 
 
  
Visual Aid: None Home Medications: None PATIENT INSTRUCTIONS: 
 
 
F-face looks uneven A-arms unable to move or move unevenly S-speech slurred or non-existent T-time-call 911 as soon as signs and symptoms begin-DO NOT go Back to bed or wait to see if you get better-TIME IS BRAIN. Warning Signs of HEART ATTACK Call 911 if you have these symptoms: 
? Chest discomfort. Most heart attacks involve discomfort in the center of the chest that lasts more than a few minutes, or that goes away and comes back. It can feel like uncomfortable pressure, squeezing, fullness, or pain. ? Discomfort in other areas of the upper body. Symptoms can include pain or discomfort in one or both arms, the back, neck, jaw, or stomach. ? Shortness of breath with or without chest discomfort. ? Other signs may include breaking out in a cold sweat, nausea, or lightheadedness. Don't wait more than five minutes to call [address]! Fast action can save your life. Calling 911 is almost always the fastest way to get lifesaving treatment.  Emergency Medical Services staff can begin treatment when they arrive  up to an hour sooner than if someone gets to the hospital by car. The discharge information has been reviewed with the patient. The patient verbalized understanding. Discharge medications reviewed with the patient and appropriate educational materials and side effects teaching were provided. Pulmonary Embolism: Care Instructions Your Care Instructions Pulmonary embolism is the sudden blockage of an artery in the lung. Blood clots in the deep veins of the leg or pelvis (deep vein thrombosis, or DVT) are the most common cause. These blood clots can travel to the lungs. Pulmonary embolism can be very serious. Because you have had one pulmonary embolism, you are at greater risk for having another one. But you can take steps to prevent another pulmonary embolism by following your doctor's instructions. You will probably take a prescription blood-thinning medicine to prevent blood clots. A blood thinner can stop a blood clot from growing larger and prevent new clots from forming. Follow-up care is a key part of your treatment and safety. Be sure to make and go to all appointments, and call your doctor if you are having problems. It's also a good idea to know your test results and keep a list of the medicines you take. How can you care for yourself at home? · Take your medicines exactly as prescribed. Call your doctor if you think you are having a problem with your medicine. You will get more details on the specific medicines your doctor prescribes. · If you are taking a blood thinner, be sure you get instructions about how to take your medicine safely. Blood thinners can cause serious bleeding problems. Preventing future pulmonary embolisms · Exercise. Keep blood moving in your legs to keep clots from forming. If you are traveling by car, stop every hour or so. Get out and walk around for a few minutes.  If you are traveling by bus, train, or plane, get out of your seat and walk up and down the aisles every hour or so. You also can do leg exercises while you are seated. Pump your feet up and down by pulling your toes up toward your knees then pointing them down. · Get up out of bed as soon as possible after an illness or surgery. · Do not smoke. If you need help quitting, talk to your doctor about stop-smoking programs and medicines. These can increase your chances of quitting for good. · Check with your doctor before taking hormone or birth control pills. These may increase your risk of blot clots. · Ask your doctor about wearing compression stockings to help prevent blood clots in your legs. You can buy these with a prescription at medical supply stores and some drugstores. When should you call for help? Call 911 anytime you think you may need emergency care. For example, call if: 
· You have shortness of breath. · You have chest pain. · You passed out (lost consciousness). · You cough up blood. Call your doctor now or seek immediate medical care if: 
· You have new or worsening pain or swelling in your leg. Watch closely for changes in your health, and be sure to contact your doctor if: 
· You do not get better as expected. Where can you learn more? Go to http://denise-zarina.info/. Enter V273 in the search box to learn more about \"Pulmonary Embolism: Care Instructions. \" Current as of: June 4, 2016 Content Version: 11.1 © 7715-7368 [employer]wise, Incorporated. Care instructions adapted under license by [employer] (which disclaims liability or warranty for this information). If you have questions about a medical condition or this instruction, always ask your healthcare professional. [place] [address] any warranty or liability for your use of this information. Discharge Orders None [facility] Announcement  We are excited to announce that we are making your provider's discharge notes available to you in [employer]. You will see these notes when they are completed and signed by the physician that discharged you from your recent hospital stay. If you have any questions or concerns about any information you see in [employer], please call the Health Information Department where you were seen or reach out to your Primary Care Provider for more information about your plan of care. Introducing [facility]! Carrillo Gondeena introduces [employer] patient portal. Now you can access parts of your medical record, email your doctor's office, and request medication refills online. 1. In your internet browser, go to [email or web address] 2. Click on the First Time User? Click Here link in the Sign In box. You will see the New Member Sign Up page. 3. Enter your [employer] Access Code exactly as it appears below. You will not need to use this code after youve completed the sign-up process. If you do not sign up before the expiration date, you must request a new code. · [employer] Access Code: [email or web address] Expires: 4/30/2017 12:33 PM 
 
4. Enter the last four digits of your Social Security Number (xxxx) and Date of Birth (mm/dd/yyyy) as indicated and click Submit. You will be taken to the next sign-up page. 5. Create a [employer] ID. This will be your [employer] login ID and cannot be changed, so think of one that is secure and easy to remember. 6. Create a [employer] password. You can change your password at any time. 7. Enter your Password Reset Question and Answer. This can be used at a later time if you forget your password. 8. Enter your e-mail address. You will receive e-mail notification when new information is available in [address] 9. Click Sign Up. You can now view and download portions of your medical record. 10. Click the Download Summary menu link to download a portable copy of your medical information. If you have questions, please visit the Frequently Asked Questions section of the MyChart website. Remember, MyChart is NOT to be used for urgent needs. For medical emergencies, dial 911. Now available from your iPhone and Android! General Information Please provide this summary of care documentation to your next provider. Patient Signature:  ____________________________________________________________ Date:  ____________________________________________________________  
  
Esther Hero Provider Signature:  ____________________________________________________________ Date:  ____________________________________________________________ normal sinus rhythm

## 2020-11-03 ENCOUNTER — HOSPITAL ENCOUNTER (OUTPATIENT)
Dept: LAB | Age: 45
Discharge: HOME | End: 2020-11-03
Payer: COMMERCIAL

## 2020-11-03 DIAGNOSIS — D50.0 IRON DEFICIENCY ANEMIA DUE TO CHRONIC BLOOD LOSS: ICD-10-CM

## 2020-11-03 LAB
ALBUMIN SERPL-MCNC: 3.1 G/DL (ref 3.5–5)
ALBUMIN/GLOB SERPL: 0.8 {RATIO} (ref 1.2–3.5)
ALP SERPL-CCNC: 56 U/L (ref 50–136)
ALT SERPL-CCNC: 25 U/L (ref 12–65)
ANION GAP SERPL CALC-SCNC: 3 MMOL/L (ref 7–16)
AST SERPL-CCNC: 23 U/L (ref 15–37)
BASOPHILS # BLD: 0 K/UL (ref 0–0.2)
BASOPHILS NFR BLD: 0 % (ref 0–2)
BILIRUB SERPL-MCNC: 0.2 MG/DL (ref 0.2–1.1)
BUN SERPL-MCNC: 10 MG/DL (ref 6–23)
CALCIUM SERPL-MCNC: 8.3 MG/DL (ref 8.3–10.4)
CHLORIDE SERPL-SCNC: 109 MMOL/L (ref 98–107)
CO2 SERPL-SCNC: 28 MMOL/L (ref 21–32)
CREAT SERPL-MCNC: 0.6 MG/DL (ref 0.6–1)
DIFFERENTIAL METHOD BLD: ABNORMAL
EOSINOPHIL # BLD: 0.1 K/UL (ref 0–0.8)
EOSINOPHIL NFR BLD: 2 % (ref 0.5–7.8)
ERYTHROCYTE [DISTWIDTH] IN BLOOD BY AUTOMATED COUNT: 13.8 % (ref 11.9–14.6)
FERRITIN SERPL-MCNC: 4 NG/ML (ref 8–388)
GLOBULIN SER CALC-MCNC: 3.7 G/DL (ref 2.3–3.5)
GLUCOSE SERPL-MCNC: 84 MG/DL (ref 65–100)
HCT VFR BLD AUTO: 34.4 % (ref 35.8–46.3)
HGB BLD-MCNC: 10.5 G/DL (ref 11.7–15.4)
IMM GRANULOCYTES # BLD AUTO: 0 K/UL (ref 0–0.5)
IMM GRANULOCYTES NFR BLD AUTO: 0 % (ref 0–5)
IRON SATN MFR SERPL: 4 %
IRON SERPL-MCNC: 22 UG/DL (ref 35–150)
LYMPHOCYTES # BLD: 2.1 K/UL (ref 0.5–4.6)
LYMPHOCYTES NFR BLD: 31 % (ref 13–44)
MCH RBC QN AUTO: 25.9 PG (ref 26.1–32.9)
MCHC RBC AUTO-ENTMCNC: 30.5 G/DL (ref 31.4–35)
MCV RBC AUTO: 84.7 FL (ref 79.6–97.8)
MONOCYTES # BLD: 0.4 K/UL (ref 0.1–1.3)
MONOCYTES NFR BLD: 6 % (ref 4–12)
NEUTS SEG # BLD: 4.1 K/UL (ref 1.7–8.2)
NEUTS SEG NFR BLD: 61 % (ref 43–78)
NRBC # BLD: 0 K/UL (ref 0–0.2)
PLATELET # BLD AUTO: 295 K/UL (ref 150–450)
PMV BLD AUTO: 10.3 FL (ref 9.4–12.3)
POTASSIUM SERPL-SCNC: 4.1 MMOL/L (ref 3.5–5.1)
PROT SERPL-MCNC: 6.8 G/DL (ref 6.3–8.2)
RBC # BLD AUTO: 4.06 M/UL (ref 4.05–5.25)
SODIUM SERPL-SCNC: 140 MMOL/L (ref 136–145)
TIBC SERPL-MCNC: 489 UG/DL (ref 250–450)
WBC # BLD AUTO: 6.8 K/UL (ref 4.3–11.1)

## 2020-11-03 PROCEDURE — 36415 COLL VENOUS BLD VENIPUNCTURE: CPT

## 2020-11-03 PROCEDURE — 85025 COMPLETE CBC W/AUTO DIFF WBC: CPT

## 2020-11-03 PROCEDURE — 83540 ASSAY OF IRON: CPT

## 2020-11-03 PROCEDURE — 80053 COMPREHEN METABOLIC PANEL: CPT

## 2020-11-03 PROCEDURE — 82728 ASSAY OF FERRITIN: CPT

## 2020-11-09 ENCOUNTER — HOSPITAL ENCOUNTER (OUTPATIENT)
Dept: INFUSION THERAPY | Age: 45
Discharge: HOME | End: 2020-11-09
Payer: COMMERCIAL

## 2020-11-09 VITALS
SYSTOLIC BLOOD PRESSURE: 114 MMHG | OXYGEN SATURATION: 99 % | DIASTOLIC BLOOD PRESSURE: 80 MMHG | RESPIRATION RATE: 16 BRPM | HEART RATE: 77 BPM | TEMPERATURE: 97.8 F

## 2020-11-09 DIAGNOSIS — D50.0 IRON DEFICIENCY ANEMIA DUE TO CHRONIC BLOOD LOSS: Primary | ICD-10-CM

## 2020-11-09 PROCEDURE — 96361 HYDRATE IV INFUSION ADD-ON: CPT

## 2020-11-09 PROCEDURE — 96374 THER/PROPH/DIAG INJ IV PUSH: CPT

## 2020-11-09 PROCEDURE — 74011250636 HC RX REV CODE- 250/636: Performed by: INTERNAL MEDICINE

## 2020-11-09 PROCEDURE — 74011000258 HC RX REV CODE- 258: Performed by: INTERNAL MEDICINE

## 2020-11-09 RX ORDER — SODIUM CHLORIDE 0.9 % (FLUSH) 0.9 %
10 SYRINGE (ML) INJECTION AS NEEDED
Status: DISCONTINUED | OUTPATIENT
Start: 2020-11-09 | End: 2020-11-10 | Stop reason: HOSPADM

## 2020-11-09 RX ADMIN — SODIUM CHLORIDE 500 ML: 900 INJECTION, SOLUTION INTRAVENOUS at 17:00

## 2020-11-09 RX ADMIN — FERUMOXYTOL 510 MG: 510 INJECTION INTRAVENOUS at 16:45

## 2020-11-09 RX ADMIN — Medication 10 ML: at 16:40

## 2020-11-09 NOTE — PROGRESS NOTES
Arrived to the [facility]. Isauro completed. Patient tolerated well. Any issues or concerns during appointment: None. Patient aware of next infusion appointment on November 16th at 1630. Discharged ambulatory.

## 2020-11-16 ENCOUNTER — HOSPITAL ENCOUNTER (OUTPATIENT)
Dept: INFUSION THERAPY | Age: 45
Discharge: HOME | End: 2020-11-16
Payer: COMMERCIAL

## 2020-11-16 VITALS
DIASTOLIC BLOOD PRESSURE: 60 MMHG | OXYGEN SATURATION: 96 % | HEART RATE: 76 BPM | SYSTOLIC BLOOD PRESSURE: 89 MMHG | RESPIRATION RATE: 18 BRPM | TEMPERATURE: 98.6 F

## 2020-11-16 DIAGNOSIS — D50.0 IRON DEFICIENCY ANEMIA DUE TO CHRONIC BLOOD LOSS: Primary | ICD-10-CM

## 2020-11-16 PROCEDURE — 96374 THER/PROPH/DIAG INJ IV PUSH: CPT

## 2020-11-16 PROCEDURE — 74011250636 HC RX REV CODE- 250/636: Performed by: INTERNAL MEDICINE

## 2020-11-16 PROCEDURE — 74011000258 HC RX REV CODE- 258: Performed by: INTERNAL MEDICINE

## 2020-11-16 RX ORDER — SODIUM CHLORIDE 0.9 % (FLUSH) 0.9 %
10 SYRINGE (ML) INJECTION AS NEEDED
Status: DISCONTINUED | OUTPATIENT
Start: 2020-11-16 | End: 2020-11-17 | Stop reason: HOSPADM

## 2020-11-16 RX ADMIN — SODIUM CHLORIDE 500 ML: 900 INJECTION, SOLUTION INTRAVENOUS at 15:50

## 2020-11-16 RX ADMIN — FERUMOXYTOL 510 MG: 510 INJECTION INTRAVENOUS at 16:04

## 2020-11-16 RX ADMIN — Medication 10 ML: at 16:00

## 2020-11-16 NOTE — PROGRESS NOTES
Pt. Discharged ambulatory. Tolerated infusion well. No distress noted. To call physician with any problems or concerns. Understanding voiced. Pt. To return to [address] [place] on 2/16/21.

## 2021-02-19 ENCOUNTER — APPOINTMENT (OUTPATIENT)
Dept: GENERAL RADIOLOGY | Age: 46
End: 2021-02-19
Attending: EMERGENCY MEDICINE
Payer: COMMERCIAL

## 2021-02-19 ENCOUNTER — HOSPITAL ENCOUNTER (EMERGENCY)
Age: 46
Discharge: HOME | End: 2021-02-19
Attending: EMERGENCY MEDICINE
Payer: COMMERCIAL

## 2021-02-19 ENCOUNTER — APPOINTMENT (OUTPATIENT)
Dept: CT IMAGING | Age: 46
End: 2021-02-19
Attending: EMERGENCY MEDICINE
Payer: COMMERCIAL

## 2021-02-19 VITALS
HEIGHT: 66 IN | DIASTOLIC BLOOD PRESSURE: 79 MMHG | WEIGHT: 175 LBS | SYSTOLIC BLOOD PRESSURE: 118 MMHG | OXYGEN SATURATION: 99 % | BODY MASS INDEX: 28.12 KG/M2 | TEMPERATURE: 97.9 F | HEART RATE: 69 BPM | RESPIRATION RATE: 17 BRPM

## 2021-02-19 DIAGNOSIS — R07.9 ACUTE CHEST PAIN: Primary | ICD-10-CM

## 2021-02-19 LAB
ALBUMIN SERPL-MCNC: 3.6 G/DL (ref 3.5–5)
ALBUMIN/GLOB SERPL: 1.1 {RATIO} (ref 1.2–3.5)
ALP SERPL-CCNC: 64 U/L (ref 50–136)
ALT SERPL-CCNC: 17 U/L (ref 12–65)
ANION GAP SERPL CALC-SCNC: 5 MMOL/L (ref 7–16)
AST SERPL-CCNC: 13 U/L (ref 15–37)
ATRIAL RATE: 68 BPM
BASOPHILS # BLD: 0 K/UL (ref 0–0.2)
BASOPHILS NFR BLD: 0 % (ref 0–2)
BILIRUB SERPL-MCNC: 0.2 MG/DL (ref 0.2–1.1)
BUN SERPL-MCNC: 11 MG/DL (ref 6–23)
CALCIUM SERPL-MCNC: 8.3 MG/DL (ref 8.3–10.4)
CALCULATED P AXIS, ECG09: 66 DEGREES
CALCULATED R AXIS, ECG10: 18 DEGREES
CALCULATED T AXIS, ECG11: 11 DEGREES
CHLORIDE SERPL-SCNC: 110 MMOL/L (ref 98–107)
CO2 SERPL-SCNC: 27 MMOL/L (ref 21–32)
CREAT SERPL-MCNC: 0.77 MG/DL (ref 0.6–1)
DIAGNOSIS, 93000: NORMAL
DIFFERENTIAL METHOD BLD: NORMAL
EOSINOPHIL # BLD: 0.2 K/UL (ref 0–0.8)
EOSINOPHIL NFR BLD: 2 % (ref 0.5–7.8)
ERYTHROCYTE [DISTWIDTH] IN BLOOD BY AUTOMATED COUNT: 13.9 % (ref 11.9–14.6)
GLOBULIN SER CALC-MCNC: 3.4 G/DL (ref 2.3–3.5)
GLUCOSE SERPL-MCNC: 91 MG/DL (ref 65–100)
HCT VFR BLD AUTO: 39.5 % (ref 35.8–46.3)
HGB BLD-MCNC: 12.6 G/DL (ref 11.7–15.4)
IMM GRANULOCYTES # BLD AUTO: 0 K/UL (ref 0–0.5)
IMM GRANULOCYTES NFR BLD AUTO: 0 % (ref 0–5)
LYMPHOCYTES # BLD: 2.8 K/UL (ref 0.5–4.6)
LYMPHOCYTES NFR BLD: 30 % (ref 13–44)
MAGNESIUM SERPL-MCNC: 2.2 MG/DL (ref 1.8–2.4)
MCH RBC QN AUTO: 28.5 PG (ref 26.1–32.9)
MCHC RBC AUTO-ENTMCNC: 31.9 G/DL (ref 31.4–35)
MCV RBC AUTO: 89.4 FL (ref 79.6–97.8)
MONOCYTES # BLD: 0.5 K/UL (ref 0.1–1.3)
MONOCYTES NFR BLD: 6 % (ref 4–12)
NEUTS SEG # BLD: 5.7 K/UL (ref 1.7–8.2)
NEUTS SEG NFR BLD: 61 % (ref 43–78)
NRBC # BLD: 0 K/UL (ref 0–0.2)
PLATELET # BLD AUTO: 282 K/UL (ref 150–450)
PMV BLD AUTO: 10.6 FL (ref 9.4–12.3)
POTASSIUM SERPL-SCNC: 3.2 MMOL/L (ref 3.5–5.1)
PROT SERPL-MCNC: 7 G/DL (ref 6.3–8.2)
Q-T INTERVAL, ECG07: 408 MS
QRS DURATION, ECG06: 78 MS
QTC CALCULATION (BEZET), ECG08: 431 MS
RBC # BLD AUTO: 4.42 M/UL (ref 4.05–5.2)
SODIUM SERPL-SCNC: 142 MMOL/L (ref 136–145)
TROPONIN-HIGH SENSITIVITY: 5.8 PG/ML (ref 0–14)
TROPONIN-HIGH SENSITIVITY: 6.4 PG/ML (ref 0–14)
VENTRICULAR RATE, ECG03: 67 BPM
WBC # BLD AUTO: 9.3 K/UL (ref 4.3–11.1)

## 2021-02-19 PROCEDURE — 83735 ASSAY OF MAGNESIUM: CPT

## 2021-02-19 PROCEDURE — 99284 EMERGENCY DEPT VISIT MOD MDM: CPT

## 2021-02-19 PROCEDURE — 71045 X-RAY EXAM CHEST 1 VIEW: CPT

## 2021-02-19 PROCEDURE — 74011000258 HC RX REV CODE- 258: Performed by: EMERGENCY MEDICINE

## 2021-02-19 PROCEDURE — 36415 COLL VENOUS BLD VENIPUNCTURE: CPT

## 2021-02-19 PROCEDURE — 74011000636 HC RX REV CODE- 636: Performed by: EMERGENCY MEDICINE

## 2021-02-19 PROCEDURE — 84484 ASSAY OF TROPONIN QUANT: CPT

## 2021-02-19 PROCEDURE — 74011250636 HC RX REV CODE- 250/636: Performed by: EMERGENCY MEDICINE

## 2021-02-19 PROCEDURE — 96375 TX/PRO/DX INJ NEW DRUG ADDON: CPT

## 2021-02-19 PROCEDURE — 85025 COMPLETE CBC W/AUTO DIFF WBC: CPT

## 2021-02-19 PROCEDURE — 80053 COMPREHEN METABOLIC PANEL: CPT

## 2021-02-19 PROCEDURE — 93005 ELECTROCARDIOGRAM TRACING: CPT | Performed by: EMERGENCY MEDICINE

## 2021-02-19 PROCEDURE — 96374 THER/PROPH/DIAG INJ IV PUSH: CPT

## 2021-02-19 PROCEDURE — 71260 CT THORAX DX C+: CPT

## 2021-02-19 RX ORDER — SODIUM CHLORIDE 0.9 % (FLUSH) 0.9 %
10 SYRINGE (ML) INJECTION
Status: COMPLETED | OUTPATIENT
Start: 2021-02-19 | End: 2021-02-19

## 2021-02-19 RX ORDER — MORPHINE SULFATE 4 MG/ML
4 INJECTION INTRAVENOUS
Status: COMPLETED | OUTPATIENT
Start: 2021-02-19 | End: 2021-02-19

## 2021-02-19 RX ORDER — ONDANSETRON 2 MG/ML
4 INJECTION INTRAMUSCULAR; INTRAVENOUS
Status: COMPLETED | OUTPATIENT
Start: 2021-02-19 | End: 2021-02-19

## 2021-02-19 RX ORDER — TRAMADOL HYDROCHLORIDE 50 MG/1
50 TABLET ORAL
Qty: 12 TAB | Refills: 0 | Status: SHIPPED | OUTPATIENT
Start: 2021-02-19 | End: 2021-02-22

## 2021-02-19 RX ADMIN — IOPAMIDOL 100 ML: 755 INJECTION, SOLUTION INTRAVENOUS at 20:37

## 2021-02-19 RX ADMIN — SODIUM CHLORIDE 1000 ML: 900 INJECTION, SOLUTION INTRAVENOUS at 18:59

## 2021-02-19 RX ADMIN — ONDANSETRON 4 MG: 2 INJECTION INTRAMUSCULAR; INTRAVENOUS at 18:59

## 2021-02-19 RX ADMIN — MORPHINE SULFATE 4 MG: 4 INJECTION INTRAVENOUS at 18:59

## 2021-02-19 RX ADMIN — Medication 10 ML: at 20:37

## 2021-02-19 RX ADMIN — SODIUM CHLORIDE 100 ML: 900 INJECTION, SOLUTION INTRAVENOUS at 20:37

## 2021-02-19 NOTE — ED TRIAGE NOTES
Pt arrives with complaints of chest pain x several days. Pt with hx of factor  5 deficiency and PE's.

## 2021-02-20 LAB
ATRIAL RATE: 68 BPM
CALCULATED P AXIS, ECG09: 45 DEGREES
CALCULATED R AXIS, ECG10: 16 DEGREES
CALCULATED T AXIS, ECG11: 8 DEGREES
DIAGNOSIS, 93000: NORMAL
P-R INTERVAL, ECG05: 128 MS
Q-T INTERVAL, ECG07: 442 MS
QRS DURATION, ECG06: 82 MS
QTC CALCULATION (BEZET), ECG08: 469 MS
VENTRICULAR RATE, ECG03: 68 BPM

## 2021-02-20 NOTE — ED PROVIDER NOTES
Presents with complaint of chest pain. Patient reports chest pain is across her chest and has been going on for several days. She states it feels similar to previous PE but does not feel short of breath. She has not been taking her Xarelto as prescribed. She gets samples from cardiology but states she does not pick them up sometimes. She has been taking Aleve without relief. The history is provided by the patient. Chest Pain   This is a new problem. The current episode started more than 2 days ago. The problem has been gradually worsening. The problem occurs constantly. The pain is present in the substernal region. The pain is moderate. The quality of the pain is described as pleuritic. The pain does not radiate. Associated symptoms include back pain and nausea. Pertinent negatives include no fever, no lower extremity edema and no shortness of breath. She has tried NSAIDs for the symptoms. The treatment provided no relief. Her past medical history is significant for DVT and PE.        Past Medical History:   Diagnosis Date    Bipolar disorder, unspecified ([facility] [facility]     [facility]     Psychiatric disorder     Pulmonary embolism (HCC)        Past Surgical History:   Procedure Laterality Date    HX  SECTION      HX GASTRIC BYPASS      HX GASTRIC BYPASS      HX MYOMECTOMY           Family History:   Problem Relation Age of Onset    Stroke Father     Hypertension Father     Diabetes Father        Social History     Socioeconomic History    Marital status:      Spouse name: Not on file    Number of children: Not on file    Years of education: Not on file    Highest education level: Not on file   Occupational History    Not on file   Social Needs    Financial resource strain: Not on file    Food insecurity     Worry: Not on file     Inability: Not on file    Transportation needs     Medical: Not on file     Non-medical: Not on file   Tobacco Use    Smoking status: Never Smoker    Smokeless tobacco: Never Used   Substance and Sexual Activity    Alcohol use: No    Drug use: No    Sexual activity: Not on file   Lifestyle    Physical activity     Days per week: Not on file     Minutes per session: Not on file    Stress: Not on file   Relationships    Social connections     Talks on phone: Not on file     Gets together: Not on file     Attends Episcopal service: Not on file     Active member of club or organization: Not on file     Attends meetings of clubs or organizations: Not on file     Relationship status: Not on file    Intimate partner violence     Fear of current or ex partner: Not on file     Emotionally abused: Not on file     Physically abused: Not on file     Forced sexual activity: Not on file   Other Topics Concern    Not on file   Social History Narrative    Not on file         ALLERGIES: Sulfa (sulfonamide antibiotics)    Review of Systems   Constitutional: Negative for chills and fever. Respiratory: Negative for shortness of breath. Cardiovascular: Positive for chest pain. Gastrointestinal: Positive for nausea. Musculoskeletal: Positive for back pain. All other systems reviewed and are negative. Vitals:    02/19/21 1822 02/19/21 1854 02/19/21 1858   BP: 108/68 113/78 117/74   Pulse: 73 65 70   Resp: 16 13 21   Temp: 97.9 °F (36.6 °C)     SpO2: 98% 100% 99%   Weight: 79.4 kg (175 lb)     Height: 5' 6\" (1.676 m)              Physical Exam  Vitals signs and nursing note reviewed. Constitutional:       General: She is in acute distress (appears uncomfortable). Appearance: She is well-developed. She is not diaphoretic. HENT:      Head: Normocephalic and atraumatic. Eyes:      General:         Right eye: No discharge. Left eye: No discharge. Conjunctiva/sclera: Conjunctivae normal.   Neck:      Musculoskeletal: Normal range of motion and neck supple.    Cardiovascular:      Rate and Rhythm: Normal rate and regular rhythm. Pulmonary:      Effort: Pulmonary effort is normal. No respiratory distress. Breath sounds: Normal breath sounds. Abdominal:      General: There is no distension. Palpations: Abdomen is soft. Tenderness: There is no abdominal tenderness. Musculoskeletal: Normal range of motion. Skin:     General: Skin is warm and dry. Capillary Refill: Capillary refill takes less than 2 seconds. Neurological:      Mental Status: She is alert and oriented to person, place, and time. Cranial Nerves: No cranial nerve deficit. Psychiatric:         Behavior: Behavior normal.          MDM  Number of Diagnoses or Management Options  Acute chest pain  Diagnosis management comments: Patient has been noncompliant with her Xarelto so CT of her chest will be done    Pt advised to take meds as rx'd. Home with pain medication. Unclear cause of chest pain but no PE and neg trops x2.          Amount and/or Complexity of Data Reviewed  Clinical lab tests: ordered and reviewed  Tests in the radiology section of CPT®: ordered and reviewed  Review and summarize past medical records: yes  Discuss the patient with other providers: yes  Independent visualization of images, tracings, or specimens: yes    Risk of Complications, Morbidity, and/or Mortality  Presenting problems: high  Diagnostic procedures: minimal  Management options: high    Patient Progress  Patient progress: improved         EKG    Date/Time: 2/19/2021 8:15 PM  Performed by: Dano Rico MD  Authorized by: Dano Rico MD     ECG reviewed by ED Physician in the absence of a cardiologist: yes    Previous ECG:     Previous ECG:  Compared to current    Comparison ECG info:  NSR    Similarity:  No change  Interpretation:     Interpretation: non-specific    Rate:     ECG rate:  68    ECG rate assessment: normal    Rhythm:     Rhythm: sinus rhythm    Ectopy:     Ectopy: none    QRS:     QRS axis:  Normal  Conduction:     Conduction: normal    Other findings:     Other findings: prolonged qTc interval      Other findings comment:  470

## 2021-02-20 NOTE — ED NOTES
I have reviewed discharge instructions with the patient. The patient verbalized understanding. Patient left ED via Discharge Method: ambulatory to Home. Opportunity for questions and clarification provided. Patient given 0 scripts. To continue your aftercare when you leave the hospital, you may receive an automated call from our care team to check in on how you are doing. This is a free service and part of our promise to provide the best care and service to meet your aftercare needs.  If you have questions, or wish to unsubscribe from this service please call [phone]. Thank you for Choosing our [facility] Emergency Department.

## 2021-10-22 ENCOUNTER — HOSPITAL ENCOUNTER (OUTPATIENT)
Dept: LAB | Age: 46
Discharge: HOME | End: 2021-10-22
Payer: COMMERCIAL

## 2021-10-22 DIAGNOSIS — D50.0 IRON DEFICIENCY ANEMIA DUE TO CHRONIC BLOOD LOSS: ICD-10-CM

## 2021-10-22 LAB
ALBUMIN SERPL-MCNC: 3 G/DL (ref 3.5–5)
ALBUMIN/GLOB SERPL: 0.7 {RATIO} (ref 1.2–3.5)
ALP SERPL-CCNC: 66 U/L (ref 50–136)
ALT SERPL-CCNC: 23 U/L (ref 12–65)
ANION GAP SERPL CALC-SCNC: 5 MMOL/L (ref 7–16)
AST SERPL-CCNC: 31 U/L (ref 15–37)
BASOPHILS # BLD: 0 K/UL (ref 0–0.2)
BASOPHILS NFR BLD: 0 % (ref 0–2)
BILIRUB SERPL-MCNC: 0.3 MG/DL (ref 0.2–1.1)
BUN SERPL-MCNC: 8 MG/DL (ref 6–23)
CALCIUM SERPL-MCNC: 8.6 MG/DL (ref 8.3–10.4)
CHLORIDE SERPL-SCNC: 111 MMOL/L (ref 98–107)
CO2 SERPL-SCNC: 25 MMOL/L (ref 21–32)
CREAT SERPL-MCNC: 0.6 MG/DL (ref 0.6–1)
DIFFERENTIAL METHOD BLD: ABNORMAL
EOSINOPHIL # BLD: 0.2 K/UL (ref 0–0.8)
EOSINOPHIL NFR BLD: 2 % (ref 0.5–7.8)
ERYTHROCYTE [DISTWIDTH] IN BLOOD BY AUTOMATED COUNT: 13.4 % (ref 11.9–14.6)
FERRITIN SERPL-MCNC: 5 NG/ML (ref 8–388)
GLOBULIN SER CALC-MCNC: 4.1 G/DL (ref 2.3–3.5)
GLUCOSE SERPL-MCNC: 82 MG/DL (ref 65–100)
HCT VFR BLD AUTO: 34 % (ref 35.8–46.3)
HGB BLD-MCNC: 10.1 G/DL (ref 11.7–15.4)
IMM GRANULOCYTES # BLD AUTO: 0 K/UL (ref 0–0.5)
IMM GRANULOCYTES NFR BLD AUTO: 0 % (ref 0–5)
IRON SATN MFR SERPL: 13 %
IRON SERPL-MCNC: 66 UG/DL (ref 35–150)
LYMPHOCYTES # BLD: 1.3 K/UL (ref 0.5–4.6)
LYMPHOCYTES NFR BLD: 17 % (ref 13–44)
MCH RBC QN AUTO: 24.4 PG (ref 26.1–32.9)
MCHC RBC AUTO-ENTMCNC: 29.7 G/DL (ref 31.4–35)
MCV RBC AUTO: 82.1 FL (ref 79.6–97.8)
MONOCYTES # BLD: 0.6 K/UL (ref 0.1–1.3)
MONOCYTES NFR BLD: 7 % (ref 4–12)
NEUTS SEG # BLD: 5.8 K/UL (ref 1.7–8.2)
NEUTS SEG NFR BLD: 73 % (ref 43–78)
NRBC # BLD: 0 K/UL (ref 0–0.2)
PLATELET # BLD AUTO: 302 K/UL (ref 150–450)
PMV BLD AUTO: 10.3 FL (ref 9.4–12.3)
POTASSIUM SERPL-SCNC: 4 MMOL/L (ref 3.5–5.1)
PROT SERPL-MCNC: 7.1 G/DL (ref 6.3–8.2)
RBC # BLD AUTO: 4.14 M/UL (ref 4.05–5.2)
SODIUM SERPL-SCNC: 141 MMOL/L (ref 136–145)
TIBC SERPL-MCNC: 495 UG/DL (ref 250–450)
VIT B12 SERPL-MCNC: 292 PG/ML (ref 193–986)
WBC # BLD AUTO: 7.9 K/UL (ref 4.3–11.1)

## 2021-10-22 PROCEDURE — 82607 VITAMIN B-12: CPT

## 2021-10-22 PROCEDURE — 36415 COLL VENOUS BLD VENIPUNCTURE: CPT

## 2021-10-22 PROCEDURE — 80053 COMPREHEN METABOLIC PANEL: CPT

## 2021-10-22 PROCEDURE — 83540 ASSAY OF IRON: CPT

## 2021-10-22 PROCEDURE — 85025 COMPLETE CBC W/AUTO DIFF WBC: CPT

## 2021-10-22 PROCEDURE — 82728 ASSAY OF FERRITIN: CPT

## 2021-11-03 ENCOUNTER — HOSPITAL ENCOUNTER (OUTPATIENT)
Dept: INFUSION THERAPY | Age: 46
Discharge: HOME | End: 2021-11-03
Payer: COMMERCIAL

## 2021-11-03 VITALS
RESPIRATION RATE: 16 BRPM | SYSTOLIC BLOOD PRESSURE: 99 MMHG | TEMPERATURE: 98.1 F | OXYGEN SATURATION: 97 % | DIASTOLIC BLOOD PRESSURE: 63 MMHG | HEART RATE: 91 BPM

## 2021-11-03 DIAGNOSIS — D50.0 IRON DEFICIENCY ANEMIA DUE TO CHRONIC BLOOD LOSS: Primary | ICD-10-CM

## 2021-11-03 PROCEDURE — 74011000258 HC RX REV CODE- 258: Performed by: NURSE PRACTITIONER

## 2021-11-03 PROCEDURE — 96365 THER/PROPH/DIAG IV INF INIT: CPT

## 2021-11-03 PROCEDURE — 74011250636 HC RX REV CODE- 250/636: Performed by: NURSE PRACTITIONER

## 2021-11-03 PROCEDURE — 96361 HYDRATE IV INFUSION ADD-ON: CPT

## 2021-11-03 RX ORDER — SODIUM CHLORIDE 0.9 % (FLUSH) 0.9 %
10 SYRINGE (ML) INJECTION AS NEEDED
Status: DISCONTINUED | OUTPATIENT
Start: 2021-11-03 | End: 2021-11-04 | Stop reason: HOSPADM

## 2021-11-03 RX ADMIN — Medication 10 ML: at 14:05

## 2021-11-03 RX ADMIN — FERUMOXYTOL 510 MG: 510 INJECTION INTRAVENOUS at 14:36

## 2021-11-03 RX ADMIN — SODIUM CHLORIDE 500 ML: 900 INJECTION, SOLUTION INTRAVENOUS at 14:05

## 2021-11-03 NOTE — PROGRESS NOTES
Pt arrived ambulatory to [facility]. IV established with good blood return. NS infusing. Injectafer infusing. Pt aware of next appt on 11/10/21 at 1400. Pt monitored. IV discontinued. Pt discharged ambulatory.

## 2021-11-10 ENCOUNTER — HOSPITAL ENCOUNTER (OUTPATIENT)
Dept: INFUSION THERAPY | Age: 46
Discharge: HOME | End: 2021-11-10
Payer: COMMERCIAL

## 2021-11-10 VITALS
RESPIRATION RATE: 16 BRPM | SYSTOLIC BLOOD PRESSURE: 104 MMHG | HEART RATE: 80 BPM | OXYGEN SATURATION: 95 % | DIASTOLIC BLOOD PRESSURE: 73 MMHG | TEMPERATURE: 98.2 F

## 2021-11-10 DIAGNOSIS — D50.0 IRON DEFICIENCY ANEMIA DUE TO CHRONIC BLOOD LOSS: Primary | ICD-10-CM

## 2021-11-10 PROCEDURE — 96361 HYDRATE IV INFUSION ADD-ON: CPT

## 2021-11-10 PROCEDURE — 74011250636 HC RX REV CODE- 250/636: Performed by: NURSE PRACTITIONER

## 2021-11-10 PROCEDURE — 74011000258 HC RX REV CODE- 258: Performed by: NURSE PRACTITIONER

## 2021-11-10 PROCEDURE — 96374 THER/PROPH/DIAG INJ IV PUSH: CPT

## 2021-11-10 RX ORDER — SODIUM CHLORIDE 0.9 % (FLUSH) 0.9 %
10 SYRINGE (ML) INJECTION AS NEEDED
Status: DISCONTINUED | OUTPATIENT
Start: 2021-11-10 | End: 2021-11-11 | Stop reason: HOSPADM

## 2021-11-10 RX ADMIN — Medication 10 ML: at 13:55

## 2021-11-10 RX ADMIN — Medication 10 ML: at 15:07

## 2021-11-10 RX ADMIN — FERUMOXYTOL 510 MG: 510 INJECTION INTRAVENOUS at 14:10

## 2021-11-10 RX ADMIN — SODIUM CHLORIDE 500 ML: 900 INJECTION, SOLUTION INTRAVENOUS at 14:26

## 2021-11-10 NOTE — PROGRESS NOTES
Arrived to the [facility]. Isauro completed. Patient tolerated well. Any issues or concerns during appointment: no.  Patient aware of next lab and [facility] office visit on 1/21/2022 (date) at 1:50pm (time). Discharged ambulatory with self.

## 2022-02-14 ENCOUNTER — HOSPITAL ENCOUNTER (OUTPATIENT)
Dept: LAB | Age: 47
Discharge: HOME | End: 2022-02-14
Payer: COMMERCIAL

## 2022-02-14 DIAGNOSIS — D50.0 IRON DEFICIENCY ANEMIA DUE TO CHRONIC BLOOD LOSS: ICD-10-CM

## 2022-02-14 LAB
ALBUMIN SERPL-MCNC: 3.6 G/DL (ref 3.5–5)
ALBUMIN/GLOB SERPL: 1.1 {RATIO} (ref 1.2–3.5)
ALP SERPL-CCNC: 59 U/L (ref 50–136)
ALT SERPL-CCNC: 14 U/L (ref 12–65)
ANION GAP SERPL CALC-SCNC: 5 MMOL/L (ref 7–16)
AST SERPL-CCNC: 10 U/L (ref 15–37)
BASOPHILS # BLD: 0 K/UL (ref 0–0.2)
BASOPHILS NFR BLD: 1 % (ref 0–2)
BILIRUB SERPL-MCNC: 0.2 MG/DL (ref 0.2–1.1)
BUN SERPL-MCNC: 7 MG/DL (ref 6–23)
CALCIUM SERPL-MCNC: 8.8 MG/DL (ref 8.3–10.4)
CHLORIDE SERPL-SCNC: 109 MMOL/L (ref 98–107)
CO2 SERPL-SCNC: 25 MMOL/L (ref 21–32)
CREAT SERPL-MCNC: 0.6 MG/DL (ref 0.6–1)
DIFFERENTIAL METHOD BLD: ABNORMAL
EOSINOPHIL # BLD: 0.1 K/UL (ref 0–0.8)
EOSINOPHIL NFR BLD: 1 % (ref 0.5–7.8)
ERYTHROCYTE [DISTWIDTH] IN BLOOD BY AUTOMATED COUNT: 14.2 % (ref 11.9–14.6)
FERRITIN SERPL-MCNC: 16 NG/ML (ref 8–388)
GLOBULIN SER CALC-MCNC: 3.3 G/DL (ref 2.3–3.5)
GLUCOSE SERPL-MCNC: 96 MG/DL (ref 65–100)
HCT VFR BLD AUTO: 40.9 % (ref 35.8–46.3)
HGB BLD-MCNC: 12.8 G/DL (ref 11.7–15.4)
IMM GRANULOCYTES # BLD AUTO: 0 K/UL (ref 0–0.5)
IMM GRANULOCYTES NFR BLD AUTO: 0 % (ref 0–5)
IRON SATN MFR SERPL: 19 %
IRON SERPL-MCNC: 72 UG/DL (ref 35–150)
LYMPHOCYTES # BLD: 1.8 K/UL (ref 0.5–4.6)
LYMPHOCYTES NFR BLD: 21 % (ref 13–44)
MCH RBC QN AUTO: 27.6 PG (ref 26.1–32.9)
MCHC RBC AUTO-ENTMCNC: 31.3 G/DL (ref 31.4–35)
MCV RBC AUTO: 88.1 FL (ref 79.6–97.8)
MONOCYTES # BLD: 0.5 K/UL (ref 0.1–1.3)
MONOCYTES NFR BLD: 6 % (ref 4–12)
NEUTS SEG # BLD: 6 K/UL (ref 1.7–8.2)
NEUTS SEG NFR BLD: 71 % (ref 43–78)
NRBC # BLD: 0 K/UL (ref 0–0.2)
PLATELET # BLD AUTO: 289 K/UL (ref 150–450)
PMV BLD AUTO: 10.3 FL (ref 9.4–12.3)
POTASSIUM SERPL-SCNC: 4 MMOL/L (ref 3.5–5.1)
PROT SERPL-MCNC: 6.9 G/DL (ref 6.3–8.2)
RBC # BLD AUTO: 4.64 M/UL (ref 4.05–5.2)
SODIUM SERPL-SCNC: 139 MMOL/L (ref 136–145)
TIBC SERPL-MCNC: 374 UG/DL (ref 250–450)
WBC # BLD AUTO: 8.5 K/UL (ref 4.3–11.1)

## 2022-02-14 PROCEDURE — 83540 ASSAY OF IRON: CPT

## 2022-02-14 PROCEDURE — 85025 COMPLETE CBC W/AUTO DIFF WBC: CPT

## 2022-02-14 PROCEDURE — 80053 COMPREHEN METABOLIC PANEL: CPT

## 2022-02-14 PROCEDURE — 36415 COLL VENOUS BLD VENIPUNCTURE: CPT

## 2022-02-14 PROCEDURE — 82728 ASSAY OF FERRITIN: CPT

## 2022-03-18 PROBLEM — D68.51 FACTOR V LEIDEN (HCC): Status: ACTIVE | Noted: 2017-02-02

## 2022-03-18 PROBLEM — R55 SYNCOPE: Status: ACTIVE | Noted: 2017-02-02

## 2022-03-19 PROBLEM — J90 PLEURAL EFFUSION: Status: ACTIVE | Noted: 2017-02-04

## 2022-03-19 PROBLEM — I26.99 PULMONARY EMBOLISM (HCC): Status: ACTIVE | Noted: 2017-02-02

## 2022-03-19 PROBLEM — D50.0 IRON DEFICIENCY ANEMIA DUE TO CHRONIC BLOOD LOSS: Status: ACTIVE | Noted: 2017-08-25

## 2022-07-12 ENCOUNTER — TELEPHONE (OUTPATIENT)
Dept: ONCOLOGY | Age: 47
End: 2022-07-12

## 2022-07-12 DIAGNOSIS — D68.51 FACTOR V LEIDEN (HCC): Primary | ICD-10-CM

## 2022-07-12 DIAGNOSIS — D50.0 IRON DEFICIENCY ANEMIA DUE TO CHRONIC BLOOD LOSS: ICD-10-CM

## 2022-07-13 ENCOUNTER — HOSPITAL ENCOUNTER (OUTPATIENT)
Dept: LAB | Age: 47
Discharge: HOME | End: 2022-07-16
Payer: COMMERCIAL

## 2022-07-13 ENCOUNTER — TELEPHONE (OUTPATIENT)
Dept: ONCOLOGY | Age: 47
End: 2022-07-13

## 2022-07-13 DIAGNOSIS — D50.0 IRON DEFICIENCY ANEMIA DUE TO CHRONIC BLOOD LOSS: ICD-10-CM

## 2022-07-13 DIAGNOSIS — D68.51 FACTOR V LEIDEN (HCC): Primary | ICD-10-CM

## 2022-07-13 DIAGNOSIS — D68.51 FACTOR V LEIDEN (HCC): ICD-10-CM

## 2022-07-13 LAB
BASOPHILS # BLD: 0.1 K/UL (ref 0–0.2)
BASOPHILS NFR BLD: 1 % (ref 0–2)
DIFFERENTIAL METHOD BLD: ABNORMAL
EOSINOPHIL # BLD: 0.1 K/UL (ref 0–0.8)
EOSINOPHIL NFR BLD: 2 % (ref 0.5–7.8)
ERYTHROCYTE [DISTWIDTH] IN BLOOD BY AUTOMATED COUNT: 13.2 % (ref 11.9–14.6)
FERRITIN SERPL-MCNC: 7 NG/ML (ref 8–388)
HCT VFR BLD AUTO: 38.7 % (ref 35.8–46.3)
HGB BLD-MCNC: 11.9 G/DL (ref 11.7–15.4)
IMM GRANULOCYTES # BLD AUTO: 0 K/UL (ref 0–0.5)
IMM GRANULOCYTES NFR BLD AUTO: 0 % (ref 0–5)
IRON SATN MFR SERPL: 14 %
IRON SERPL-MCNC: 64 UG/DL (ref 35–150)
LYMPHOCYTES # BLD: 3 K/UL (ref 0.5–4.6)
LYMPHOCYTES NFR BLD: 37 % (ref 13–44)
MCH RBC QN AUTO: 26.7 PG (ref 26.1–32.9)
MCHC RBC AUTO-ENTMCNC: 30.7 G/DL (ref 31.4–35)
MCV RBC AUTO: 86.8 FL (ref 79.6–97.8)
MONOCYTES # BLD: 0.4 K/UL (ref 0.1–1.3)
MONOCYTES NFR BLD: 5 % (ref 4–12)
NEUTS SEG # BLD: 4.6 K/UL (ref 1.7–8.2)
NEUTS SEG NFR BLD: 56 % (ref 43–78)
NRBC # BLD: 0 K/UL (ref 0–0.2)
PLATELET # BLD AUTO: 327 K/UL (ref 150–450)
PMV BLD AUTO: 10.1 FL (ref 9.4–12.3)
RBC # BLD AUTO: 4.46 M/UL (ref 4.05–5.2)
TIBC SERPL-MCNC: 452 UG/DL (ref 250–450)
VIT B12 SERPL-MCNC: >6000 PG/ML (ref 193–986)
WBC # BLD AUTO: 8.3 K/UL (ref 4.3–11.1)

## 2022-07-13 PROCEDURE — 36415 COLL VENOUS BLD VENIPUNCTURE: CPT

## 2022-07-13 PROCEDURE — 85025 COMPLETE CBC W/AUTO DIFF WBC: CPT

## 2022-07-13 PROCEDURE — 82607 VITAMIN B-12: CPT

## 2022-07-13 PROCEDURE — 82728 ASSAY OF FERRITIN: CPT

## 2022-07-13 PROCEDURE — 83540 ASSAY OF IRON: CPT

## 2022-07-13 NOTE — TELEPHONE ENCOUNTER
PT called in regards to Lab appt today at 330/stated would like doctor to review her recent lab work and her B2 levels and if a B2 lab can be added to her appt today

## 2022-07-21 ENCOUNTER — HOSPITAL ENCOUNTER (OUTPATIENT)
Dept: LAB | Age: 47
Discharge: HOME | End: 2022-07-24

## 2022-07-21 PROCEDURE — 88312 SPECIAL STAINS GROUP 1: CPT

## 2022-07-21 PROCEDURE — 88305 TISSUE EXAM BY PATHOLOGIST: CPT

## 2022-08-01 ENCOUNTER — TELEPHONE (OUTPATIENT)
Dept: ONCOLOGY | Age: 47
End: 2022-08-01

## 2022-08-01 DIAGNOSIS — D50.0 IRON DEFICIENCY ANEMIA DUE TO CHRONIC BLOOD LOSS: Primary | ICD-10-CM

## 2022-08-02 RX ORDER — SODIUM CHLORIDE 9 MG/ML
5-250 INJECTION, SOLUTION INTRAVENOUS PRN
Status: CANCELLED | OUTPATIENT
Start: 2022-08-16

## 2022-08-02 RX ORDER — FAMOTIDINE 10 MG/ML
20 INJECTION, SOLUTION INTRAVENOUS
Status: CANCELLED | OUTPATIENT
Start: 2022-08-16

## 2022-08-02 RX ORDER — ALBUTEROL SULFATE 90 UG/1
4 AEROSOL, METERED RESPIRATORY (INHALATION) PRN
Status: CANCELLED | OUTPATIENT
Start: 2022-08-16

## 2022-08-02 RX ORDER — HEPARIN SODIUM (PORCINE) LOCK FLUSH IV SOLN 100 UNIT/ML 100 UNIT/ML
500 SOLUTION INTRAVENOUS PRN
Status: CANCELLED | OUTPATIENT
Start: 2022-08-16

## 2022-08-02 RX ORDER — DIPHENHYDRAMINE HYDROCHLORIDE 50 MG/ML
50 INJECTION INTRAMUSCULAR; INTRAVENOUS
Status: CANCELLED | OUTPATIENT
Start: 2022-08-16

## 2022-08-02 RX ORDER — ACETAMINOPHEN 325 MG/1
650 TABLET ORAL
Status: CANCELLED | OUTPATIENT
Start: 2022-08-16

## 2022-08-02 RX ORDER — SODIUM CHLORIDE 0.9 % (FLUSH) 0.9 %
5-40 SYRINGE (ML) INJECTION PRN
Status: CANCELLED | OUTPATIENT
Start: 2022-08-16

## 2022-08-02 RX ORDER — ONDANSETRON 2 MG/ML
8 INJECTION INTRAMUSCULAR; INTRAVENOUS
Status: CANCELLED | OUTPATIENT
Start: 2022-08-16

## 2022-08-02 RX ORDER — EPINEPHRINE 1 MG/ML
0.3 INJECTION, SOLUTION, CONCENTRATE INTRAVENOUS PRN
Status: CANCELLED | OUTPATIENT
Start: 2022-08-16

## 2022-08-02 RX ORDER — SODIUM CHLORIDE 9 MG/ML
INJECTION, SOLUTION INTRAVENOUS CONTINUOUS
Status: CANCELLED | OUTPATIENT
Start: 2022-08-16

## 2022-08-02 NOTE — TELEPHONE ENCOUNTER
Labs reviewed with NP Gabrielle. Patient will need to get set up for IV iron-fereheme. Orders placed. Call to patient to notify.  Message sent to infusion  to make appointments and to please call the patient

## 2022-08-05 DIAGNOSIS — D50.9 IRON DEFICIENCY ANEMIA, UNSPECIFIED IRON DEFICIENCY ANEMIA TYPE: Primary | ICD-10-CM

## 2022-08-09 ENCOUNTER — HOSPITAL ENCOUNTER (OUTPATIENT)
Dept: INFUSION THERAPY | Age: 47
End: 2022-08-09

## 2022-08-16 ENCOUNTER — TELEPHONE (OUTPATIENT)
Dept: ONCOLOGY | Age: 47
End: 2022-08-16

## 2022-08-16 ENCOUNTER — HOSPITAL ENCOUNTER (OUTPATIENT)
Dept: INFUSION THERAPY | Age: 47
Discharge: HOME | End: 2022-08-16
Payer: COMMERCIAL

## 2022-08-16 VITALS
HEART RATE: 82 BPM | SYSTOLIC BLOOD PRESSURE: 106 MMHG | DIASTOLIC BLOOD PRESSURE: 70 MMHG | RESPIRATION RATE: 16 BRPM | OXYGEN SATURATION: 97 % | TEMPERATURE: 97.5 F

## 2022-08-16 DIAGNOSIS — D50.0 IRON DEFICIENCY ANEMIA DUE TO CHRONIC BLOOD LOSS: Primary | ICD-10-CM

## 2022-08-16 PROCEDURE — 6360000002 HC RX W HCPCS: Performed by: NURSE PRACTITIONER

## 2022-08-16 PROCEDURE — 2580000003 HC RX 258: Performed by: NURSE PRACTITIONER

## 2022-08-16 PROCEDURE — 96365 THER/PROPH/DIAG IV INF INIT: CPT

## 2022-08-16 RX ORDER — ACETAMINOPHEN 325 MG/1
650 TABLET ORAL
OUTPATIENT
Start: 2022-08-23

## 2022-08-16 RX ORDER — DIPHENHYDRAMINE HYDROCHLORIDE 50 MG/ML
50 INJECTION INTRAMUSCULAR; INTRAVENOUS
Status: ACTIVE | OUTPATIENT
Start: 2022-08-16 | End: 2022-08-16

## 2022-08-16 RX ORDER — SODIUM CHLORIDE 9 MG/ML
5-250 INJECTION, SOLUTION INTRAVENOUS PRN
Status: DISCONTINUED | OUTPATIENT
Start: 2022-08-16 | End: 2022-08-17 | Stop reason: HOSPADM

## 2022-08-16 RX ORDER — DIPHENHYDRAMINE HYDROCHLORIDE 50 MG/ML
50 INJECTION INTRAMUSCULAR; INTRAVENOUS
OUTPATIENT
Start: 2022-08-23

## 2022-08-16 RX ORDER — SODIUM CHLORIDE 9 MG/ML
INJECTION, SOLUTION INTRAVENOUS CONTINUOUS
OUTPATIENT
Start: 2022-08-23

## 2022-08-16 RX ORDER — SODIUM CHLORIDE 0.9 % (FLUSH) 0.9 %
5-40 SYRINGE (ML) INJECTION PRN
Status: CANCELLED | OUTPATIENT
Start: 2022-08-23

## 2022-08-16 RX ORDER — SODIUM CHLORIDE 0.9 % (FLUSH) 0.9 %
5-40 SYRINGE (ML) INJECTION PRN
Status: DISCONTINUED | OUTPATIENT
Start: 2022-08-16 | End: 2022-08-17 | Stop reason: HOSPADM

## 2022-08-16 RX ORDER — ONDANSETRON 2 MG/ML
8 INJECTION INTRAMUSCULAR; INTRAVENOUS
OUTPATIENT
Start: 2022-08-23

## 2022-08-16 RX ORDER — SODIUM CHLORIDE 9 MG/ML
5-250 INJECTION, SOLUTION INTRAVENOUS PRN
Status: CANCELLED | OUTPATIENT
Start: 2022-08-23

## 2022-08-16 RX ORDER — SODIUM CHLORIDE 9 MG/ML
5-250 INJECTION, SOLUTION INTRAVENOUS PRN
OUTPATIENT
Start: 2022-08-23

## 2022-08-16 RX ORDER — EPINEPHRINE 1 MG/ML
0.3 INJECTION, SOLUTION, CONCENTRATE INTRAVENOUS PRN
OUTPATIENT
Start: 2022-08-23

## 2022-08-16 RX ORDER — HEPARIN SODIUM (PORCINE) LOCK FLUSH IV SOLN 100 UNIT/ML 100 UNIT/ML
500 SOLUTION INTRAVENOUS PRN
OUTPATIENT
Start: 2022-08-23

## 2022-08-16 RX ORDER — ALBUTEROL SULFATE 90 UG/1
4 AEROSOL, METERED RESPIRATORY (INHALATION) PRN
OUTPATIENT
Start: 2022-08-23

## 2022-08-16 RX ADMIN — FERUMOXYTOL 510 MG: 510 INJECTION INTRAVENOUS at 09:03

## 2022-08-16 RX ADMIN — Medication 10 ML: at 08:35

## 2022-08-16 RX ADMIN — SODIUM CHLORIDE 25 ML/HR: 9 INJECTION, SOLUTION INTRAVENOUS at 08:35

## 2022-08-16 NOTE — TELEPHONE ENCOUNTER
Pt came to the  requesting to r/s her lab and f/up nilam on 8/22. Would like to know if she can have the nilam on 8/23 or 8/24 instead.

## 2022-08-16 NOTE — PROGRESS NOTES
Arrived to the [facility]. Feraheme infusion completed. Patient tolerated well. Any issues or concerns during appointment: no.  Patient aware of next infusion appointment on 8/23/2022 (date) at 0800 (time). Patient aware of next lab and [facility] office visit on 8/22/2022 (date) at 1300 (time). Patient instructed to call provider with temperature of 100.4 or greater or nausea/vomiting/ diarrhea or pain not controlled by medications  Discharged ambulatory.

## 2022-08-18 ENCOUNTER — TELEPHONE (OUTPATIENT)
Dept: ONCOLOGY | Age: 47
End: 2022-08-18

## 2022-08-18 NOTE — TELEPHONE ENCOUNTER
Pt states moved second iron infusion to 08/23 and would like her NP visit to be r/s to follow that infusion in the next available. Please cancel appts on 08/22 and contact pt with new appt info.

## 2022-08-23 ENCOUNTER — HOSPITAL ENCOUNTER (OUTPATIENT)
Dept: INFUSION THERAPY | Age: 47
Discharge: HOME | End: 2022-08-23
Payer: COMMERCIAL

## 2022-08-23 ENCOUNTER — TELEPHONE (OUTPATIENT)
Dept: ONCOLOGY | Age: 47
End: 2022-08-23

## 2022-08-23 VITALS
RESPIRATION RATE: 16 BRPM | DIASTOLIC BLOOD PRESSURE: 66 MMHG | SYSTOLIC BLOOD PRESSURE: 96 MMHG | OXYGEN SATURATION: 98 % | HEART RATE: 85 BPM | TEMPERATURE: 98.1 F

## 2022-08-23 DIAGNOSIS — D50.0 IRON DEFICIENCY ANEMIA DUE TO CHRONIC BLOOD LOSS: Primary | ICD-10-CM

## 2022-08-23 PROCEDURE — 2580000003 HC RX 258: Performed by: NURSE PRACTITIONER

## 2022-08-23 PROCEDURE — 96365 THER/PROPH/DIAG IV INF INIT: CPT

## 2022-08-23 PROCEDURE — 6360000002 HC RX W HCPCS: Performed by: NURSE PRACTITIONER

## 2022-08-23 RX ORDER — ALBUTEROL SULFATE 90 UG/1
4 AEROSOL, METERED RESPIRATORY (INHALATION) PRN
OUTPATIENT
Start: 2022-08-23

## 2022-08-23 RX ORDER — ACETAMINOPHEN 325 MG/1
650 TABLET ORAL
OUTPATIENT
Start: 2022-08-23

## 2022-08-23 RX ORDER — EPINEPHRINE 1 MG/ML
0.3 INJECTION, SOLUTION, CONCENTRATE INTRAVENOUS PRN
OUTPATIENT
Start: 2022-08-23

## 2022-08-23 RX ORDER — DIPHENHYDRAMINE HYDROCHLORIDE 50 MG/ML
50 INJECTION INTRAMUSCULAR; INTRAVENOUS
OUTPATIENT
Start: 2022-08-23

## 2022-08-23 RX ORDER — SODIUM CHLORIDE 9 MG/ML
5-250 INJECTION, SOLUTION INTRAVENOUS PRN
OUTPATIENT
Start: 2022-08-23

## 2022-08-23 RX ORDER — SODIUM CHLORIDE 9 MG/ML
5-250 INJECTION, SOLUTION INTRAVENOUS PRN
Status: DISCONTINUED | OUTPATIENT
Start: 2022-08-23 | End: 2022-08-24 | Stop reason: HOSPADM

## 2022-08-23 RX ORDER — SODIUM CHLORIDE 0.9 % (FLUSH) 0.9 %
5-40 SYRINGE (ML) INJECTION PRN
Status: DISCONTINUED | OUTPATIENT
Start: 2022-08-23 | End: 2022-08-24 | Stop reason: HOSPADM

## 2022-08-23 RX ORDER — SODIUM CHLORIDE 9 MG/ML
INJECTION, SOLUTION INTRAVENOUS CONTINUOUS
OUTPATIENT
Start: 2022-08-23

## 2022-08-23 RX ORDER — SODIUM CHLORIDE 9 MG/ML
5-250 INJECTION, SOLUTION INTRAVENOUS PRN
Status: CANCELLED | OUTPATIENT
Start: 2022-08-23

## 2022-08-23 RX ORDER — ONDANSETRON 2 MG/ML
8 INJECTION INTRAMUSCULAR; INTRAVENOUS
OUTPATIENT
Start: 2022-08-23

## 2022-08-23 RX ORDER — SODIUM CHLORIDE 0.9 % (FLUSH) 0.9 %
5-40 SYRINGE (ML) INJECTION PRN
Status: CANCELLED | OUTPATIENT
Start: 2022-08-23

## 2022-08-23 RX ORDER — HEPARIN SODIUM (PORCINE) LOCK FLUSH IV SOLN 100 UNIT/ML 100 UNIT/ML
500 SOLUTION INTRAVENOUS PRN
OUTPATIENT
Start: 2022-08-23

## 2022-08-23 RX ADMIN — SODIUM CHLORIDE, PRESERVATIVE FREE 10 ML: 5 INJECTION INTRAVENOUS at 08:31

## 2022-08-23 RX ADMIN — FERUMOXYTOL 510 MG: 510 INJECTION INTRAVENOUS at 08:55

## 2022-08-23 NOTE — PROGRESS NOTES
Arrived to the [facility]. Misti completed. Patient tolerated well. Any issues or concerns during appointment: none. Patient aware of next lab and [facility] office visit on 8/25/22 (date) at 11:30 am (time). Patient instructed to call provider with temperature of 100.4 or greater or nausea/vomiting/ diarrhea or pain not controlled by medications  Discharged ambulatory.

## 2022-11-03 RX ORDER — RIVAROXABAN 10 MG/1
TABLET, FILM COATED ORAL
Qty: 90 TABLET | Refills: 2 | Status: SHIPPED | OUTPATIENT
Start: 2022-11-03

## 2023-06-28 ENCOUNTER — TELEPHONE (OUTPATIENT)
Dept: ONCOLOGY | Age: 48
End: 2023-06-28

## 2023-06-29 RX ORDER — RIVAROXABAN 10 MG/1
10 TABLET, FILM COATED ORAL DAILY
Qty: 30 TABLET | Refills: 5 | Status: SHIPPED | OUTPATIENT
Start: 2023-06-29

## 2023-07-03 ENCOUNTER — HOSPITAL ENCOUNTER (EMERGENCY)
Age: 48
Discharge: HOME | End: 2023-07-03
Attending: EMERGENCY MEDICINE

## 2023-07-03 ENCOUNTER — TELEPHONE (OUTPATIENT)
Age: 48
End: 2023-07-03

## 2023-07-03 ENCOUNTER — APPOINTMENT (OUTPATIENT)
Dept: CT IMAGING | Age: 48
End: 2023-07-03

## 2023-07-03 VITALS
BODY MASS INDEX: 26.36 KG/M2 | DIASTOLIC BLOOD PRESSURE: 65 MMHG | SYSTOLIC BLOOD PRESSURE: 99 MMHG | RESPIRATION RATE: 18 BRPM | HEART RATE: 69 BPM | WEIGHT: 164 LBS | HEIGHT: 66 IN | TEMPERATURE: 98.4 F | OXYGEN SATURATION: 97 %

## 2023-07-03 DIAGNOSIS — N39.0 URINARY TRACT INFECTION IN FEMALE: ICD-10-CM

## 2023-07-03 DIAGNOSIS — R55 SYNCOPE AND COLLAPSE: Primary | ICD-10-CM

## 2023-07-03 LAB
ALBUMIN SERPL-MCNC: 3.9 G/DL (ref 3.5–5)
ALBUMIN/GLOB SERPL: 1.1 (ref 0.4–1.6)
ALP SERPL-CCNC: 48 U/L (ref 50–136)
ALT SERPL-CCNC: 14 U/L (ref 12–65)
ANION GAP SERPL CALC-SCNC: 6 MMOL/L (ref 2–11)
APPEARANCE UR: CLEAR
AST SERPL-CCNC: 17 U/L (ref 15–37)
BACTERIA URNS QL MICRO: ABNORMAL /HPF
BASOPHILS # BLD: 0 K/UL (ref 0–0.2)
BASOPHILS NFR BLD: 1 % (ref 0–2)
BILIRUB SERPL-MCNC: 0.5 MG/DL (ref 0.2–1.1)
BILIRUB UR QL: NEGATIVE
BILIRUB UR QL: NEGATIVE
BUN SERPL-MCNC: 5 MG/DL (ref 6–23)
CALCIUM SERPL-MCNC: 9 MG/DL (ref 8.3–10.4)
CASTS URNS QL MICRO: ABNORMAL /LPF
CHLORIDE SERPL-SCNC: 107 MMOL/L (ref 101–110)
CO2 SERPL-SCNC: 24 MMOL/L (ref 21–32)
COLOR UR: ABNORMAL
CREAT SERPL-MCNC: 0.8 MG/DL (ref 0.6–1)
D DIMER PPP FEU-MCNC: <0.27 UG/ML(FEU)
DIFFERENTIAL METHOD BLD: NORMAL
EOSINOPHIL # BLD: 0.1 K/UL (ref 0–0.8)
EOSINOPHIL NFR BLD: 1 % (ref 0.5–7.8)
EPI CELLS #/AREA URNS HPF: ABNORMAL /HPF
ERYTHROCYTE [DISTWIDTH] IN BLOOD BY AUTOMATED COUNT: 13.2 % (ref 11.9–14.6)
GLOBULIN SER CALC-MCNC: 3.5 G/DL (ref 2.8–4.5)
GLUCOSE SERPL-MCNC: 85 MG/DL (ref 65–100)
GLUCOSE UR QL STRIP.AUTO: NEGATIVE MG/DL
GLUCOSE UR STRIP.AUTO-MCNC: NEGATIVE MG/DL
HCG UR QL: NEGATIVE
HCT VFR BLD AUTO: 42.9 % (ref 35.8–46.3)
HGB BLD-MCNC: 13.8 G/DL (ref 11.7–15.4)
HGB UR QL STRIP: ABNORMAL
IMM GRANULOCYTES # BLD AUTO: 0 K/UL (ref 0–0.5)
IMM GRANULOCYTES NFR BLD AUTO: 0 % (ref 0–5)
KETONES UR QL STRIP.AUTO: NEGATIVE MG/DL
KETONES UR-MCNC: NEGATIVE MG/DL
LEUKOCYTE ESTERASE UR QL STRIP.AUTO: ABNORMAL
LEUKOCYTE ESTERASE UR QL STRIP: ABNORMAL
LYMPHOCYTES # BLD: 2.6 K/UL (ref 0.5–4.6)
LYMPHOCYTES NFR BLD: 31 % (ref 13–44)
MAGNESIUM SERPL-MCNC: 2.2 MG/DL (ref 1.8–2.4)
MCH RBC QN AUTO: 27.8 PG (ref 26.1–32.9)
MCHC RBC AUTO-ENTMCNC: 32.2 G/DL (ref 31.4–35)
MCV RBC AUTO: 86.3 FL (ref 82–102)
MONOCYTES # BLD: 0.4 K/UL (ref 0.1–1.3)
MONOCYTES NFR BLD: 5 % (ref 4–12)
NEUTS SEG # BLD: 5.2 K/UL (ref 1.7–8.2)
NEUTS SEG NFR BLD: 62 % (ref 43–78)
NITRITE UR QL STRIP.AUTO: NEGATIVE
NITRITE UR QL: NEGATIVE
NRBC # BLD: 0 K/UL (ref 0–0.2)
PH UR STRIP: 6 (ref 5–9)
PH UR: 6 (ref 5–9)
PLATELET # BLD AUTO: 280 K/UL (ref 150–450)
PMV BLD AUTO: 10.4 FL (ref 9.4–12.3)
POTASSIUM SERPL-SCNC: 3.7 MMOL/L (ref 3.5–5.1)
PROT SERPL-MCNC: 7.4 G/DL (ref 6.3–8.2)
PROT UR QL: NEGATIVE MG/DL
PROT UR STRIP-MCNC: NEGATIVE MG/DL
RBC # BLD AUTO: 4.97 M/UL (ref 4.05–5.2)
RBC # UR STRIP: ABNORMAL
RBC #/AREA URNS HPF: ABNORMAL /HPF
SERVICE CMNT-IMP: ABNORMAL
SODIUM SERPL-SCNC: 137 MMOL/L (ref 133–143)
SP GR UR REFRACTOMETRY: 1.01 (ref 1–1.02)
SP GR UR: 1.01 (ref 1–1.02)
TROPONIN I SERPL HS-MCNC: 3.2 PG/ML (ref 0–14)
TROPONIN I SERPL HS-MCNC: 3.8 PG/ML (ref 0–14)
UROBILINOGEN UR QL STRIP.AUTO: 0.2 EU/DL (ref 0.2–1)
UROBILINOGEN UR QL: 0.2 EU/DL (ref 0.2–1)
WBC # BLD AUTO: 8.4 K/UL (ref 4.3–11.1)
WBC URNS QL MICRO: ABNORMAL /HPF

## 2023-07-03 PROCEDURE — 83735 ASSAY OF MAGNESIUM: CPT

## 2023-07-03 PROCEDURE — 2580000003 HC RX 258: Performed by: NURSE PRACTITIONER

## 2023-07-03 PROCEDURE — 80053 COMPREHEN METABOLIC PANEL: CPT

## 2023-07-03 PROCEDURE — 99284 EMERGENCY DEPT VISIT MOD MDM: CPT

## 2023-07-03 PROCEDURE — 93005 ELECTROCARDIOGRAM TRACING: CPT | Performed by: NURSE PRACTITIONER

## 2023-07-03 PROCEDURE — 81025 URINE PREGNANCY TEST: CPT

## 2023-07-03 PROCEDURE — 70450 CT HEAD/BRAIN W/O DYE: CPT

## 2023-07-03 PROCEDURE — 85025 COMPLETE CBC W/AUTO DIFF WBC: CPT

## 2023-07-03 PROCEDURE — 81001 URINALYSIS AUTO W/SCOPE: CPT

## 2023-07-03 PROCEDURE — 81003 URINALYSIS AUTO W/O SCOPE: CPT

## 2023-07-03 PROCEDURE — 84484 ASSAY OF TROPONIN QUANT: CPT

## 2023-07-03 PROCEDURE — 96360 HYDRATION IV INFUSION INIT: CPT

## 2023-07-03 PROCEDURE — 96361 HYDRATE IV INFUSION ADD-ON: CPT

## 2023-07-03 PROCEDURE — 85379 FIBRIN DEGRADATION QUANT: CPT

## 2023-07-03 RX ORDER — 0.9 % SODIUM CHLORIDE 0.9 %
1000 INTRAVENOUS SOLUTION INTRAVENOUS ONCE
Status: COMPLETED | OUTPATIENT
Start: 2023-07-03 | End: 2023-07-03

## 2023-07-03 RX ORDER — CEPHALEXIN 500 MG/1
500 CAPSULE ORAL 2 TIMES DAILY
Qty: 14 CAPSULE | Refills: 0 | Status: SHIPPED | OUTPATIENT
Start: 2023-07-03 | End: 2023-07-10

## 2023-07-03 RX ADMIN — SODIUM CHLORIDE 1000 ML: 9 INJECTION, SOLUTION INTRAVENOUS at 17:31

## 2023-07-03 ASSESSMENT — LIFESTYLE VARIABLES
HOW MANY STANDARD DRINKS CONTAINING ALCOHOL DO YOU HAVE ON A TYPICAL DAY: PATIENT DOES NOT DRINK
HOW OFTEN DO YOU HAVE A DRINK CONTAINING ALCOHOL: NEVER

## 2023-07-03 ASSESSMENT — PAIN SCALES - GENERAL: PAINLEVEL_OUTOF10: 7

## 2023-07-03 ASSESSMENT — PAIN DESCRIPTION - DESCRIPTORS: DESCRIPTORS: ACHING

## 2023-07-03 ASSESSMENT — PAIN DESCRIPTION - LOCATION: LOCATION: HEAD

## 2023-07-03 NOTE — TELEPHONE ENCOUNTER
Pt did a tilt-table test w/ Dr. Anton Ro, and had passed out. [place]. EMS took vitals which were ok. They suggests going to ER, but pt only trusts Dr. Anton Ro.  Pt asks for him to put in an order for a CT Scan. She has a bump on her head, but nothing else. Please advise as quickly as possible what next steps should be.

## 2023-07-03 NOTE — TELEPHONE ENCOUNTER
Pt had syncopal episode today and hit her head. Asking if Dr. Pablo Turner can order a CT at an outpatient imaging center. Explained that she needs to be evaluated in the ER to determine why she was syncopal. Verb understanding.

## 2023-07-03 NOTE — ED TRIAGE NOTES
Pt arrives via pov ambulatory c/o HA after a syncopal episode. States hit head on concrete. Pt states people around me screaming are you ok. Possibly seized according to surrounding people. Takes xarelto. PCP referred pt to come to check for bleed.

## 2023-07-04 LAB
EKG ATRIAL RATE: 70 BPM
EKG DIAGNOSIS: NORMAL
EKG P AXIS: 56 DEGREES
EKG P-R INTERVAL: 135 MS
EKG Q-T INTERVAL: 426 MS
EKG QRS DURATION: 104 MS
EKG QTC CALCULATION (BAZETT): 460 MS
EKG R AXIS: 43 DEGREES
EKG T AXIS: 28 DEGREES
EKG VENTRICULAR RATE: 70 BPM

## 2023-07-04 PROCEDURE — 93010 ELECTROCARDIOGRAM REPORT: CPT | Performed by: INTERNAL MEDICINE

## 2024-06-24 ENCOUNTER — TELEPHONE (OUTPATIENT)
Age: 49
End: 2024-06-24

## 2024-06-24 NOTE — TELEPHONE ENCOUNTER
I spoke with the pt and let her know that there is no charge for filling out pt assistance forms. Pt can drop off her portion filled out along with support docs (proof of income and payout from pharmacy) and we will fax it in for her. Pt voiced understanding.

## 2024-06-24 NOTE — TELEPHONE ENCOUNTER
Is there a charge for filling out forms for financial assistance with xarelto?  Please advise.  How long does the process take as well.

## 2024-10-09 ENCOUNTER — OFFICE VISIT (OUTPATIENT)
Age: 49
End: 2024-10-09

## 2024-10-09 VITALS
HEIGHT: 65 IN | HEART RATE: 77 BPM | DIASTOLIC BLOOD PRESSURE: 78 MMHG | WEIGHT: 152.7 LBS | SYSTOLIC BLOOD PRESSURE: 102 MMHG | BODY MASS INDEX: 25.44 KG/M2

## 2024-10-09 DIAGNOSIS — I26.95 CEMENT PULMONARY EMBOLUS, UNSPECIFIED WHETHER ACUTE COR PULMONALE PRESENT (HCC): Primary | ICD-10-CM

## 2024-10-09 DIAGNOSIS — T84.81XA CEMENT PULMONARY EMBOLUS, UNSPECIFIED WHETHER ACUTE COR PULMONALE PRESENT (HCC): Primary | ICD-10-CM

## 2024-10-09 DIAGNOSIS — D64.9 ANEMIA, UNSPECIFIED TYPE: ICD-10-CM

## 2024-10-09 DIAGNOSIS — Z18.83 CEMENT PULMONARY EMBOLUS, UNSPECIFIED WHETHER ACUTE COR PULMONALE PRESENT (HCC): Primary | ICD-10-CM

## 2024-10-09 LAB
BASOPHILS # BLD: 0 K/UL (ref 0–0.2)
BASOPHILS NFR BLD: 0 % (ref 0–2)
DIFFERENTIAL METHOD BLD: NORMAL
EOSINOPHIL # BLD: 0.1 K/UL (ref 0–0.8)
EOSINOPHIL NFR BLD: 1 % (ref 0.5–7.8)
ERYTHROCYTE [DISTWIDTH] IN BLOOD BY AUTOMATED COUNT: 12.6 % (ref 11.9–14.6)
HCT VFR BLD AUTO: 41 % (ref 35.8–46.3)
HGB BLD-MCNC: 13 G/DL (ref 11.7–15.4)
IMM GRANULOCYTES # BLD AUTO: 0 K/UL (ref 0–0.5)
IMM GRANULOCYTES NFR BLD AUTO: 0 % (ref 0–5)
IRON SATN MFR SERPL: 26 % (ref 20–50)
IRON SERPL-MCNC: 97 UG/DL (ref 35–100)
LYMPHOCYTES # BLD: 1.9 K/UL (ref 0.5–4.6)
LYMPHOCYTES NFR BLD: 24 % (ref 13–44)
MCH RBC QN AUTO: 28.6 PG (ref 26.1–32.9)
MCHC RBC AUTO-ENTMCNC: 31.7 G/DL (ref 31.4–35)
MCV RBC AUTO: 90.3 FL (ref 82–102)
MONOCYTES # BLD: 0.4 K/UL (ref 0.1–1.3)
MONOCYTES NFR BLD: 5 % (ref 4–12)
NEUTS SEG # BLD: 5.3 K/UL (ref 1.7–8.2)
NEUTS SEG NFR BLD: 70 % (ref 43–78)
NRBC # BLD: 0 K/UL (ref 0–0.2)
PLATELET # BLD AUTO: 281 K/UL (ref 150–450)
PMV BLD AUTO: 10 FL (ref 9.4–12.3)
RBC # BLD AUTO: 4.54 M/UL (ref 4.05–5.2)
TIBC SERPL-MCNC: 378 UG/DL (ref 240–450)
TRANSFERRIN SERPL-MCNC: 317 MG/DL (ref 200–360)
UIBC SERPL-MCNC: 281 UG/DL (ref 112–347)
WBC # BLD AUTO: 7.6 K/UL (ref 4.3–11.1)

## 2024-10-09 RX ORDER — RIVAROXABAN 10 MG/1
10 TABLET, FILM COATED ORAL DAILY
Qty: 90 TABLET | Refills: 3 | Status: SHIPPED | OUTPATIENT
Start: 2024-10-09

## 2024-10-09 NOTE — PROGRESS NOTES
[facility], [address], [address]  [place] [address]  PHONE: [phone]    SUBJECTIVE:   Chloe Roberts is a 49 y.o. female 1975   seen for a follow up visit regarding the following:     Chief Complaint   Patient presents with    Medication Refill         History of present illness: 49 y.o. female presented for follow-up 10/9/24 [place].  No recent shortness of breath.  She has a history of DVT PE.  She has been on Xarelto 10 mg daily for the past several years.  Her dose of 20 mg was decreased due to menstrual bleeding.  We have discussed possible hysterectomy in the past.  She is not a candidate for hormone therapies due to coagulopathy.  She has no complaints today she continues to have heavy menses but they have improved since going on a lower dose of Xarelto.  nterval history:   The patient presented for follow up 22.   was placed on Xarelto 10 mg daily based on new extension trial data.  The patient has a history  of factor V Leiden gene mutation, as well as pulmonary embolus.  She has heavy menses that were of concern to her, but have improved greatly after going on a lower dose.           Cardiac History:     Homozygous factor V Leiden mutation.     Pulmonary embolus in , provoked following .     The patient is not on anticoagulation therapy after being seen by hematologist/oncologist.      Syncope 2017.     Echocardiogram performed at [facility] interpreted by Dr. Fransisco Ni, reported no valvular abnormalities.  Normal left ventricular systolic function.  RVSP 49 mmHg, diastolic dysfunction.     Cardiac monitor placed 2017.      EKG at [facility], normal sinus rhythm, terminal T-wave inversion noticed in the lateral leads with normal QT interval.      No family history of sudden cardiac death.      Hemoglobin, as well as renal function evaluated 2017 within

## 2024-10-11 NOTE — RESULT ENCOUNTER NOTE
Your most recent hemoglobin and iron levels were within normal limits.    Please let me know if you have additional questions or concerns.    Gigi Duval MD

## 2024-12-11 ENCOUNTER — TRANSCRIBE ORDERS (OUTPATIENT)
Dept: SCHEDULING | Age: 49
End: 2024-12-11

## 2024-12-11 DIAGNOSIS — Z12.31 SCREENING MAMMOGRAM FOR BREAST CANCER: Primary | ICD-10-CM

## 2025-01-04 ENCOUNTER — HOSPITAL ENCOUNTER (OUTPATIENT)
Dept: MAMMOGRAPHY | Age: 50
End: 2025-01-04
Payer: COMMERCIAL

## 2025-01-04 DIAGNOSIS — Z12.31 SCREENING MAMMOGRAM FOR BREAST CANCER: ICD-10-CM

## 2025-01-04 PROCEDURE — 77063 BREAST TOMOSYNTHESIS BI: CPT

## 2025-04-15 ENCOUNTER — TELEPHONE (OUTPATIENT)
Age: 50
End: 2025-04-15

## 2025-04-15 NOTE — TELEPHONE ENCOUNTER
Last seen by Dr. Duval on 10/9/24. Patient reports she has had SOB x several weeks. Has worsened over the last several days. Now SOB at rest. Seen by her PCP who gave her breathing treatment without any benefit. EKG wnl, awaiting lab results. Denies CP, dizziness, edema. Remains on Xarelto 10 mg daily. Will address with MD.

## 2025-04-15 NOTE — TELEPHONE ENCOUNTER
Please asked the patient if she has missed any doses of Xarelto.  Symptoms are of greater concern and/or similar to her prior embolic event and additional imaging would need to be considered.

## 2025-04-15 NOTE — TELEPHONE ENCOUNTER
Patient called stating she has the following issues :    SOB  Onset several weeks  Worsening symptoms  No CP  PCP visit  with breathing treatment

## 2025-04-16 NOTE — TELEPHONE ENCOUNTER
If the patient has not missed any doses of Xarelto then a pulmonary embolus is less likely.  If she is having any symptoms that are similar to her previous pulmonary embolus diagnosis we would need to consider getting a CT of the chest.  If this has been going on for weeks then checking a D-dimer would be a reasonable test as well.  If she is currently menstruating or has been menstruating recently then we would not be able to check this.  I am happy to see the patient if she wants to be seen.    If her symptoms are severe and concerning then it would be appropriate to be seen in the emergency department.

## 2025-04-16 NOTE — TELEPHONE ENCOUNTER
Please let the patient know if she is taking Xarelto and has a negative D-dimer that is very reassuring.  Please offer appointment tomorrow [phone]

## 2025-04-16 NOTE — TELEPHONE ENCOUNTER
Patient notified of MD response. Patient will see MD tomorrow at 12:30. Patient verbalized understanding.

## 2025-04-17 ENCOUNTER — RESULTS FOLLOW-UP (OUTPATIENT)
Age: 50
End: 2025-04-17

## 2025-04-17 ENCOUNTER — OFFICE VISIT (OUTPATIENT)
Age: 50
End: 2025-04-17
Payer: COMMERCIAL

## 2025-04-17 VITALS
SYSTOLIC BLOOD PRESSURE: 110 MMHG | WEIGHT: 154.8 LBS | HEIGHT: 65 IN | HEART RATE: 94 BPM | BODY MASS INDEX: 25.79 KG/M2 | DIASTOLIC BLOOD PRESSURE: 70 MMHG

## 2025-04-17 DIAGNOSIS — R06.02 SOB (SHORTNESS OF BREATH): Primary | ICD-10-CM

## 2025-04-17 DIAGNOSIS — R06.02 SHORTNESS OF BREATH: ICD-10-CM

## 2025-04-17 DIAGNOSIS — D64.9 ANEMIA, UNSPECIFIED TYPE: ICD-10-CM

## 2025-04-17 LAB
ALBUMIN SERPL-MCNC: 3.6 G/DL (ref 3.5–5)
ALBUMIN/GLOB SERPL: 1.1 (ref 1–1.9)
ALP SERPL-CCNC: 65 U/L (ref 35–104)
ALT SERPL-CCNC: 28 U/L (ref 8–45)
ANION GAP SERPL CALC-SCNC: 9 MMOL/L (ref 7–16)
AST SERPL-CCNC: 26 U/L (ref 15–37)
BILIRUB SERPL-MCNC: <0.2 MG/DL (ref 0–1.2)
BUN SERPL-MCNC: 10 MG/DL (ref 6–23)
CALCIUM SERPL-MCNC: 9.4 MG/DL (ref 8.8–10.2)
CHLORIDE SERPL-SCNC: 104 MMOL/L (ref 98–107)
CO2 SERPL-SCNC: 28 MMOL/L (ref 20–29)
CREAT SERPL-MCNC: 0.58 MG/DL (ref 0.6–1.1)
FERRITIN SERPL-MCNC: 24 NG/ML (ref 8–388)
GLOBULIN SER CALC-MCNC: 3.4 G/DL (ref 2.3–3.5)
GLUCOSE SERPL-MCNC: 88 MG/DL (ref 70–99)
HGB RETIC QN AUTO: 33 PG (ref 29–35)
IMM RETICS NFR: 8.1 % (ref 3–15.9)
IRON SATN MFR SERPL: 22 % (ref 20–50)
IRON SERPL-MCNC: 100 UG/DL (ref 35–100)
POTASSIUM SERPL-SCNC: 4.6 MMOL/L (ref 3.5–5.1)
PROT SERPL-MCNC: 7 G/DL (ref 6.3–8.2)
RETICS # AUTO: 0.07 M/UL (ref 0.03–0.1)
RETICS/RBC NFR AUTO: 1.7 % (ref 0.3–2)
SODIUM SERPL-SCNC: 141 MMOL/L (ref 136–145)
TIBC SERPL-MCNC: 464 UG/DL (ref 240–450)
TSH W FREE THYROID IF ABNORMAL: 1.52 UIU/ML (ref 0.27–4.2)
UIBC SERPL-MCNC: 364 UG/DL (ref 112–347)
VIT B12 SERPL-MCNC: 2255 PG/ML (ref 193–986)

## 2025-04-17 PROCEDURE — 99214 OFFICE O/P EST MOD 30 MIN: CPT | Performed by: INTERNAL MEDICINE

## 2025-04-17 PROCEDURE — 93000 ELECTROCARDIOGRAM COMPLETE: CPT | Performed by: INTERNAL MEDICINE

## 2025-04-17 RX ORDER — CARIPRAZINE 4.5 MG/1
4.5 CAPSULE, GELATIN COATED ORAL DAILY
COMMUNITY

## 2025-04-17 NOTE — PROGRESS NOTES
[facility] CARDIOLOGY, [address], [facility] [address]  [place] [address]  PHONE: [phone]    SUBJECTIVE:   Chloe Roberts is a 49 y.o. female 1975   seen for a follow up visit regarding the following:     Chief Complaint   Patient presents with    Anticoagulation           Shortness of Breath         History of Present Illness  The patient is a 49-year-old individual with a history of pulmonary embolism, presenting with dyspnea.    The dyspnea is reportedly associated with weight loss. In , the patient was diagnosed with a pulmonary embolism while asymptomatic. The patient denies any cough or peripheral edema. They are compliant with a regimen of Xarelto, administered nightly. The patient's diet includes boiled eggs and intermittent fasting. A chest radiograph has been ordered but has not yet been completed. Blood tests have been performed, with results currently pending.    The patient has a history of menorrhagia and has previously received iron infusions. They initiated iron supplementation a few weeks ago. The patient is contemplating a hysterectomy due to uterine leiomyomas but remains undecided.    SOCIAL HISTORY  - Diet: Boiled eggs, intermittent fasting        Interval history:   The patient presented for follow up 22.   was placed on Xarelto 10 mg daily based on new extension trial data.  The patient has a history  of factor V Leiden gene mutation, as well as pulmonary embolus.  She has heavy menses that were of concern to her, but have improved greatly after going on a lower dose.           Cardiac History:     Homozygous factor V Leiden mutation.     Pulmonary embolus in , provoked following .     The patient is not on anticoagulation therapy after being seen by hematologist/oncologist.      Syncope 2017.     Echocardiogram performed at [facility] interpreted by Dr. Fransisco Ni, reported no valvular abnormalities.  Normal left ventricular

## 2025-04-18 ENCOUNTER — TELEPHONE (OUTPATIENT)
Age: 50
End: 2025-04-18

## 2025-04-18 NOTE — TELEPHONE ENCOUNTER
Patient called stating she has the following concerns :    Would like to discuss her recent lab results  Please call and advise.

## 2025-04-18 NOTE — TELEPHONE ENCOUNTER
Pt message routed to Dr. Duval 4/17/2025. Will wait for his reply.I called the pt and let her know as well.

## 2025-04-21 ENCOUNTER — TELEPHONE (OUTPATIENT)
Age: 50
End: 2025-04-21

## 2025-04-21 NOTE — TELEPHONE ENCOUNTER
Patient called stating she has the following request :    Discuss test results    Please call and advise.

## 2025-04-21 NOTE — TELEPHONE ENCOUNTER
Chloe Roberts     Thank you for taking the time to update me.     Thankfully creatinine level is low.  It comes up within\"!\"  However this is within normal limits.  Elevated creatinine means there are issues with the kidneys.      Please let me know if you have any additional concerns.     Gigi Duval MD

## 2025-04-22 ENCOUNTER — TELEPHONE (OUTPATIENT)
Age: 50
End: 2025-04-22

## 2025-04-22 NOTE — TELEPHONE ENCOUNTER
I spoke with the pt who asked about her anemia and how to get iron infusions. I let her know that on her most recent lab her iron was within limits and Dr. Duval has not suggested setting up iron infusions. Pt asked about her shortness of breath.I told the pt that she was seen 4/17 due to sob and I told her that that Dr Duval has ordered tests to try to find the source of the sob and that she has a follow up 5/14 to discuss the results. Pt stated that her shortness of breath as not worsened but she is not comfortable with it. I let her know that I will have a triage nurse call her as soon as possible.

## 2025-04-22 NOTE — TELEPHONE ENCOUNTER
Patient notified that we are trying to move up her ECHO appointment. She will be looking for a call to get that changed. Voices understanding.

## 2025-04-22 NOTE — TELEPHONE ENCOUNTER
Do you know when her echo is scheduled?.  We may need to get her in sooner to reevaluate since her labs were not remarkable.

## 2025-04-22 NOTE — TELEPHONE ENCOUNTER
Patient by Dr. Duval on 4/17/25. Reports she is still having trouble with SOB. SOB has been going on for several weeks know. Patient reports she is having trouble catching her breath. NO other symptoms reported. Will address with MD.

## 2025-04-23 NOTE — TELEPHONE ENCOUNTER
Please schedule patient for appointment on Friday in [place] after an appointment 1/31/1945 okay.  Can we try to get an echo this week?

## 2025-04-25 ENCOUNTER — OFFICE VISIT (OUTPATIENT)
Age: 50
End: 2025-04-25
Payer: COMMERCIAL

## 2025-04-25 VITALS
HEART RATE: 80 BPM | SYSTOLIC BLOOD PRESSURE: 100 MMHG | BODY MASS INDEX: 25.89 KG/M2 | DIASTOLIC BLOOD PRESSURE: 76 MMHG | WEIGHT: 151.68 LBS

## 2025-04-25 DIAGNOSIS — R06.02 SOB (SHORTNESS OF BREATH): ICD-10-CM

## 2025-04-25 DIAGNOSIS — D64.9 ANEMIA, UNSPECIFIED TYPE: ICD-10-CM

## 2025-04-25 DIAGNOSIS — R06.02 SHORTNESS OF BREATH: Primary | ICD-10-CM

## 2025-04-25 DIAGNOSIS — R06.02 SHORTNESS OF BREATH: ICD-10-CM

## 2025-04-25 LAB
BASOPHILS # BLD: 0.04 K/UL (ref 0–0.2)
BASOPHILS NFR BLD: 0.5 % (ref 0–2)
DIFFERENTIAL METHOD BLD: NORMAL
EOSINOPHIL # BLD: 0.09 K/UL (ref 0–0.8)
EOSINOPHIL NFR BLD: 1.1 % (ref 0.5–7.8)
ERYTHROCYTE [DISTWIDTH] IN BLOOD BY AUTOMATED COUNT: 13.4 % (ref 11.9–14.6)
HCT VFR BLD AUTO: 41.2 % (ref 35.8–46.3)
HGB BLD-MCNC: 13 G/DL (ref 11.7–15.4)
IMM GRANULOCYTES # BLD AUTO: 0.03 K/UL (ref 0–0.5)
IMM GRANULOCYTES NFR BLD AUTO: 0.4 % (ref 0–5)
LYMPHOCYTES # BLD: 2.3 K/UL (ref 0.5–4.6)
LYMPHOCYTES NFR BLD: 27.2 % (ref 13–44)
MCH RBC QN AUTO: 28.5 PG (ref 26.1–32.9)
MCHC RBC AUTO-ENTMCNC: 31.6 G/DL (ref 31.4–35)
MCV RBC AUTO: 90.4 FL (ref 82–102)
MONOCYTES # BLD: 0.52 K/UL (ref 0.1–1.3)
MONOCYTES NFR BLD: 6.1 % (ref 4–12)
NEUTS SEG # BLD: 5.49 K/UL (ref 1.7–8.2)
NEUTS SEG NFR BLD: 64.7 % (ref 43–78)
NRBC # BLD: 0 K/UL (ref 0–0.2)
PLATELET # BLD AUTO: 307 K/UL (ref 150–450)
PMV BLD AUTO: 10 FL (ref 9.4–12.3)
RBC # BLD AUTO: 4.56 M/UL (ref 4.05–5.2)
WBC # BLD AUTO: 8.5 K/UL (ref 4.3–11.1)

## 2025-04-25 PROCEDURE — 99214 OFFICE O/P EST MOD 30 MIN: CPT | Performed by: INTERNAL MEDICINE

## 2025-04-25 RX ORDER — LORAZEPAM 2 MG/1
2 TABLET ORAL
COMMUNITY
Start: 2025-03-28

## 2025-04-25 RX ORDER — ALBUTEROL SULFATE 90 UG/1
INHALANT RESPIRATORY (INHALATION)
COMMUNITY

## 2025-04-25 NOTE — PROGRESS NOTES
oz)   04/25/25 69.9 kg (154 lb)   04/17/25 70.2 kg (154 lb 12.8 oz)     BP Readings from Last 3 Encounters:   04/25/25 100/76   04/25/25 110/70   04/17/25 110/70           Physical Exam  Physical Exam  Heart: Strong heart sounds, no pericardial effusion.  Lungs: Decreased respiratory effort, no wheezing.  Neurologic: No ptosis, diplopia, dysphagia, dysarthria, or other weakness.    Medical problems and test results were reviewed with the patient today.           Recent Results (from the past 4 weeks)   Comprehensive Metabolic Panel    Collection Time: 04/17/25  2:16 PM   Result Value Ref Range    Sodium 141 136 - 145 mmol/L    Potassium 4.6 3.5 - 5.1 mmol/L    Chloride 104 98 - 107 mmol/L    CO2 28 20 - 29 mmol/L    Anion Gap 9 7 - 16 mmol/L    Glucose 88 70 - 99 mg/dL    BUN 10 6 - 23 MG/DL    Creatinine 0.58 (L) 0.60 - 1.10 MG/DL    Est, Glom Filt Rate >90 >60 ml/min/1.73m2    Calcium 9.4 8.8 - 10.2 MG/DL    Total Bilirubin <0.2 0.0 - 1.2 MG/DL    ALT 28 8 - 45 U/L    AST 26 15 - 37 U/L    Alk Phosphatase 65 35 - 104 U/L    Total Protein 7.0 6.3 - 8.2 g/dL    Albumin 3.6 3.5 - 5.0 g/dL    Globulin 3.4 2.3 - 3.5 g/dL    Albumin/Globulin Ratio 1.1 1.0 - 1.9     TSH reflex to FT4    Collection Time: 04/17/25  2:16 PM   Result Value Ref Range    TSH w Free Thyroid if Abnormal 1.52 0.27 - 4.20 UIU/ML   Vitamin B12    Collection Time: 04/17/25  2:16 PM   Result Value Ref Range    Vitamin B-12 2255 (H) 193 - 986 pg/mL   Reticulocytes    Collection Time: 04/17/25  2:16 PM   Result Value Ref Range    Reticulocyte Count,Automated 1.7 0.3 - 2.0 %    Absolute Retic # 0.0731 0.026 - 0.095 M/ul    Immature Retic Fraction 8.1 3.0 - 15.9 %    Retic Hemoglobin conc. 33 29 - 35 pg   Iron and TIBC    Collection Time: 04/17/25  2:16 PM   Result Value Ref Range    Iron 100 35 - 100 ug/dL    TIBC 464 (H) 240 - 450 ug/dL    Iron % Saturation 22 20 - 50 %    UIBC 364.0 (H) 112.0 - 347.0 ug/dL   Ferritin    Collection Time: 04/17/25  2:16

## 2025-05-15 ENCOUNTER — TELEPHONE (OUTPATIENT)
Age: 50
End: 2025-05-15

## 2025-05-15 NOTE — TELEPHONE ENCOUNTER
Angi with [facility] letter Angi know that because Dr Duval did not prescribe this medication we would refer pack to the physician who prescribed it for concerns.

## 2025-05-15 NOTE — TELEPHONE ENCOUNTER
Angi with [facility] called to notifiy Dr. Naik of a medication: Provera-2.5mg daily for 90 days   Because [place].     This PCP office is requesting this be ran by Mukund and request a call back letting them know whether or not this is ok (either way - confirmation)